# Patient Record
Sex: FEMALE | Race: OTHER | HISPANIC OR LATINO | URBAN - METROPOLITAN AREA
[De-identification: names, ages, dates, MRNs, and addresses within clinical notes are randomized per-mention and may not be internally consistent; named-entity substitution may affect disease eponyms.]

---

## 2017-09-12 ENCOUNTER — EMERGENCY (EMERGENCY)
Facility: HOSPITAL | Age: 47
LOS: 0 days | Discharge: HOME | End: 2017-09-13
Attending: EMERGENCY MEDICINE | Admitting: EMERGENCY MEDICINE
Payer: SELF-PAY

## 2017-09-12 VITALS
DIASTOLIC BLOOD PRESSURE: 51 MMHG | OXYGEN SATURATION: 100 % | TEMPERATURE: 98 F | WEIGHT: 220.02 LBS | HEIGHT: 55 IN | HEART RATE: 85 BPM | RESPIRATION RATE: 18 BRPM | SYSTOLIC BLOOD PRESSURE: 115 MMHG

## 2017-09-12 DIAGNOSIS — R07.9 CHEST PAIN, UNSPECIFIED: ICD-10-CM

## 2017-09-12 DIAGNOSIS — Z79.84 LONG TERM (CURRENT) USE OF ORAL HYPOGLYCEMIC DRUGS: ICD-10-CM

## 2017-09-12 DIAGNOSIS — E11.9 TYPE 2 DIABETES MELLITUS WITHOUT COMPLICATIONS: ICD-10-CM

## 2017-09-12 LAB
ALBUMIN SERPL ELPH-MCNC: 3.6 G/DL — SIGNIFICANT CHANGE UP (ref 3.3–5)
ALP SERPL-CCNC: 81 U/L — SIGNIFICANT CHANGE UP (ref 40–120)
ALT FLD-CCNC: 38 U/L — SIGNIFICANT CHANGE UP (ref 12–78)
ANION GAP SERPL CALC-SCNC: 7 MMOL/L — SIGNIFICANT CHANGE UP (ref 5–17)
AST SERPL-CCNC: 19 U/L — SIGNIFICANT CHANGE UP (ref 15–37)
BASOPHILS # BLD AUTO: 0.1 K/UL — SIGNIFICANT CHANGE UP (ref 0–0.2)
BASOPHILS NFR BLD AUTO: 0.9 % — SIGNIFICANT CHANGE UP (ref 0–2)
BILIRUB SERPL-MCNC: 0.2 MG/DL — SIGNIFICANT CHANGE UP (ref 0.2–1.2)
BUN SERPL-MCNC: 11 MG/DL — SIGNIFICANT CHANGE UP (ref 7–23)
CALCIUM SERPL-MCNC: 8.7 MG/DL — SIGNIFICANT CHANGE UP (ref 8.5–10.1)
CHLORIDE SERPL-SCNC: 103 MMOL/L — SIGNIFICANT CHANGE UP (ref 96–108)
CO2 SERPL-SCNC: 30 MMOL/L — SIGNIFICANT CHANGE UP (ref 22–31)
CREAT SERPL-MCNC: 1.36 MG/DL — HIGH (ref 0.5–1.3)
D DIMER BLD IA.RAPID-MCNC: <150 NG/ML DDU — SIGNIFICANT CHANGE UP
EOSINOPHIL # BLD AUTO: 0.2 K/UL — SIGNIFICANT CHANGE UP (ref 0–0.5)
EOSINOPHIL NFR BLD AUTO: 1.7 % — SIGNIFICANT CHANGE UP (ref 0–6)
GLUCOSE SERPL-MCNC: 88 MG/DL — SIGNIFICANT CHANGE UP (ref 70–99)
HCT VFR BLD CALC: 40 % — SIGNIFICANT CHANGE UP (ref 34.5–45)
HGB BLD-MCNC: 13.6 G/DL — SIGNIFICANT CHANGE UP (ref 11.5–15.5)
INR BLD: 0.94 RATIO — SIGNIFICANT CHANGE UP (ref 0.88–1.16)
LYMPHOCYTES # BLD AUTO: 3.6 K/UL — HIGH (ref 1–3.3)
LYMPHOCYTES # BLD AUTO: 37.8 % — SIGNIFICANT CHANGE UP (ref 13–44)
MCHC RBC-ENTMCNC: 31.5 PG — SIGNIFICANT CHANGE UP (ref 27–34)
MCHC RBC-ENTMCNC: 34 GM/DL — SIGNIFICANT CHANGE UP (ref 32–36)
MCV RBC AUTO: 92.9 FL — SIGNIFICANT CHANGE UP (ref 80–100)
MONOCYTES # BLD AUTO: 0.9 K/UL — SIGNIFICANT CHANGE UP (ref 0–0.9)
MONOCYTES NFR BLD AUTO: 9.8 % — SIGNIFICANT CHANGE UP (ref 2–14)
NEUTROPHILS # BLD AUTO: 4.7 K/UL — SIGNIFICANT CHANGE UP (ref 1.8–7.4)
NEUTROPHILS NFR BLD AUTO: 49.8 % — SIGNIFICANT CHANGE UP (ref 43–77)
PLATELET # BLD AUTO: 258 K/UL — SIGNIFICANT CHANGE UP (ref 150–400)
POTASSIUM SERPL-MCNC: 4.2 MMOL/L — SIGNIFICANT CHANGE UP (ref 3.5–5.3)
POTASSIUM SERPL-SCNC: 4.2 MMOL/L — SIGNIFICANT CHANGE UP (ref 3.5–5.3)
PROT SERPL-MCNC: 7.2 GM/DL — SIGNIFICANT CHANGE UP (ref 6–8.3)
PROTHROM AB SERPL-ACNC: 10.1 SEC — SIGNIFICANT CHANGE UP (ref 9.8–12.7)
RBC # BLD: 4.3 M/UL — SIGNIFICANT CHANGE UP (ref 3.8–5.2)
RBC # FLD: 12.7 % — SIGNIFICANT CHANGE UP (ref 10.3–14.5)
SODIUM SERPL-SCNC: 140 MMOL/L — SIGNIFICANT CHANGE UP (ref 135–145)
TROPONIN I SERPL-MCNC: <0.015 NG/ML — SIGNIFICANT CHANGE UP (ref 0.01–0.04)
WBC # BLD: 9.4 K/UL — SIGNIFICANT CHANGE UP (ref 3.8–10.5)
WBC # FLD AUTO: 9.4 K/UL — SIGNIFICANT CHANGE UP (ref 3.8–10.5)

## 2017-09-12 PROCEDURE — 99285 EMERGENCY DEPT VISIT HI MDM: CPT

## 2017-09-12 PROCEDURE — 93010 ELECTROCARDIOGRAM REPORT: CPT

## 2017-09-12 PROCEDURE — 71010: CPT | Mod: 26

## 2017-09-12 RX ORDER — ASPIRIN/CALCIUM CARB/MAGNESIUM 324 MG
324 TABLET ORAL DAILY
Qty: 0 | Refills: 0 | Status: DISCONTINUED | OUTPATIENT
Start: 2017-09-12 | End: 2017-09-13

## 2017-09-12 RX ORDER — ASPIRIN/CALCIUM CARB/MAGNESIUM 324 MG
81 TABLET ORAL DAILY
Qty: 0 | Refills: 0 | Status: DISCONTINUED | OUTPATIENT
Start: 2017-09-12 | End: 2017-09-12

## 2017-09-12 RX ADMIN — Medication 324 MILLIGRAM(S): at 21:12

## 2017-09-12 NOTE — ED PROVIDER NOTE - MEDICAL DECISION MAKING DETAILS
HEART score 2.  EKG nonischemic.  CXR clear.  Troponin x 2 negative.  Unlikely ACS.  Wells low risk, D-dimer negative.  Unlikely PE.  No signs of pulmonary edema, pneumonia, or pleural effusion.  No concern for pericarditis, myocarditis, or aortic dissection.  Okay for d/c home with nonspecific chest pain.  F/u with PCP this week.  Return precautions given.

## 2017-09-12 NOTE — ED PROVIDER NOTE - OBJECTIVE STATEMENT
45 yo female h/o DM on metformin presents with CP and SOB x 2 weeks. +Dizziness, ecchymosis to posterior RLE (no pain), states her LUE and LLE fall asleep at times. PCP Dr. Lucia Angeles.

## 2017-09-13 VITALS
SYSTOLIC BLOOD PRESSURE: 117 MMHG | OXYGEN SATURATION: 100 % | DIASTOLIC BLOOD PRESSURE: 52 MMHG | HEART RATE: 88 BPM | RESPIRATION RATE: 17 BRPM

## 2017-09-13 LAB — TROPONIN I SERPL-MCNC: <0.015 NG/ML — SIGNIFICANT CHANGE UP (ref 0.01–0.04)

## 2017-09-13 NOTE — ED ADULT NURSE NOTE - OBJECTIVE STATEMENT
pt c/o chest pain that radiates to left arm and leg. Pt states " it feels like my arm and leg are asleep" pt states " after I eat it's hard to breath" family at bedside

## 2018-07-07 ENCOUNTER — EMERGENCY (EMERGENCY)
Facility: HOSPITAL | Age: 48
LOS: 0 days | Discharge: HOME | End: 2018-07-07
Attending: EMERGENCY MEDICINE | Admitting: EMERGENCY MEDICINE
Payer: MEDICAID

## 2018-07-07 VITALS
OXYGEN SATURATION: 100 % | DIASTOLIC BLOOD PRESSURE: 83 MMHG | HEIGHT: 61 IN | TEMPERATURE: 98 F | HEART RATE: 89 BPM | WEIGHT: 222.01 LBS | RESPIRATION RATE: 18 BRPM | SYSTOLIC BLOOD PRESSURE: 121 MMHG

## 2018-07-07 VITALS
OXYGEN SATURATION: 100 % | DIASTOLIC BLOOD PRESSURE: 62 MMHG | HEART RATE: 65 BPM | RESPIRATION RATE: 18 BRPM | TEMPERATURE: 98 F | SYSTOLIC BLOOD PRESSURE: 103 MMHG

## 2018-07-07 DIAGNOSIS — N93.8 OTHER SPECIFIED ABNORMAL UTERINE AND VAGINAL BLEEDING: ICD-10-CM

## 2018-07-07 LAB
ALBUMIN SERPL ELPH-MCNC: 3.4 G/DL — SIGNIFICANT CHANGE UP (ref 3.3–5)
ALP SERPL-CCNC: 73 U/L — SIGNIFICANT CHANGE UP (ref 40–120)
ALT FLD-CCNC: 59 U/L — SIGNIFICANT CHANGE UP (ref 12–78)
ANION GAP SERPL CALC-SCNC: 6 MMOL/L — SIGNIFICANT CHANGE UP (ref 5–17)
APPEARANCE UR: ABNORMAL
AST SERPL-CCNC: 34 U/L — SIGNIFICANT CHANGE UP (ref 15–37)
BACTERIA # UR AUTO: ABNORMAL
BASOPHILS # BLD AUTO: 0.03 K/UL — SIGNIFICANT CHANGE UP (ref 0–0.2)
BASOPHILS NFR BLD AUTO: 0.4 % — SIGNIFICANT CHANGE UP (ref 0–2)
BILIRUB SERPL-MCNC: 0.2 MG/DL — SIGNIFICANT CHANGE UP (ref 0.2–1.2)
BILIRUB UR-MCNC: NEGATIVE — SIGNIFICANT CHANGE UP
BLD GP AB SCN SERPL QL: SIGNIFICANT CHANGE UP
BUN SERPL-MCNC: 9 MG/DL — SIGNIFICANT CHANGE UP (ref 7–23)
CALCIUM SERPL-MCNC: 8.4 MG/DL — LOW (ref 8.5–10.1)
CHLORIDE SERPL-SCNC: 111 MMOL/L — HIGH (ref 96–108)
CO2 SERPL-SCNC: 26 MMOL/L — SIGNIFICANT CHANGE UP (ref 22–31)
COLOR SPEC: YELLOW — SIGNIFICANT CHANGE UP
CREAT SERPL-MCNC: 0.72 MG/DL — SIGNIFICANT CHANGE UP (ref 0.5–1.3)
DIFF PNL FLD: ABNORMAL
EOSINOPHIL # BLD AUTO: 0.17 K/UL — SIGNIFICANT CHANGE UP (ref 0–0.5)
EOSINOPHIL NFR BLD AUTO: 2 % — SIGNIFICANT CHANGE UP (ref 0–6)
EPI CELLS # UR: SIGNIFICANT CHANGE UP
GLUCOSE SERPL-MCNC: 108 MG/DL — HIGH (ref 70–99)
GLUCOSE UR QL: NEGATIVE MG/DL — SIGNIFICANT CHANGE UP
HCT VFR BLD CALC: 36.1 % — SIGNIFICANT CHANGE UP (ref 34.5–45)
HGB BLD-MCNC: 12 G/DL — SIGNIFICANT CHANGE UP (ref 11.5–15.5)
IMM GRANULOCYTES NFR BLD AUTO: 0.2 % — SIGNIFICANT CHANGE UP (ref 0–1.5)
KETONES UR-MCNC: NEGATIVE — SIGNIFICANT CHANGE UP
LEUKOCYTE ESTERASE UR-ACNC: ABNORMAL
LYMPHOCYTES # BLD AUTO: 2.85 K/UL — SIGNIFICANT CHANGE UP (ref 1–3.3)
LYMPHOCYTES # BLD AUTO: 34.2 % — SIGNIFICANT CHANGE UP (ref 13–44)
MCHC RBC-ENTMCNC: 30.5 PG — SIGNIFICANT CHANGE UP (ref 27–34)
MCHC RBC-ENTMCNC: 33.2 GM/DL — SIGNIFICANT CHANGE UP (ref 32–36)
MCV RBC AUTO: 91.9 FL — SIGNIFICANT CHANGE UP (ref 80–100)
MONOCYTES # BLD AUTO: 0.71 K/UL — SIGNIFICANT CHANGE UP (ref 0–0.9)
MONOCYTES NFR BLD AUTO: 8.5 % — SIGNIFICANT CHANGE UP (ref 2–14)
NEUTROPHILS # BLD AUTO: 4.55 K/UL — SIGNIFICANT CHANGE UP (ref 1.8–7.4)
NEUTROPHILS NFR BLD AUTO: 54.7 % — SIGNIFICANT CHANGE UP (ref 43–77)
NITRITE UR-MCNC: NEGATIVE — SIGNIFICANT CHANGE UP
NRBC # BLD: 0 /100 WBCS — SIGNIFICANT CHANGE UP (ref 0–0)
PH UR: 5 — SIGNIFICANT CHANGE UP (ref 5–8)
PLATELET # BLD AUTO: 261 K/UL — SIGNIFICANT CHANGE UP (ref 150–400)
POTASSIUM SERPL-MCNC: 4.1 MMOL/L — SIGNIFICANT CHANGE UP (ref 3.5–5.3)
POTASSIUM SERPL-SCNC: 4.1 MMOL/L — SIGNIFICANT CHANGE UP (ref 3.5–5.3)
PROT SERPL-MCNC: 6.9 GM/DL — SIGNIFICANT CHANGE UP (ref 6–8.3)
PROT UR-MCNC: 30 MG/DL
RBC # BLD: 3.93 M/UL — SIGNIFICANT CHANGE UP (ref 3.8–5.2)
RBC # FLD: 13.1 % — SIGNIFICANT CHANGE UP (ref 10.3–14.5)
RBC CASTS # UR COMP ASSIST: >50 /HPF (ref 0–4)
SODIUM SERPL-SCNC: 143 MMOL/L — SIGNIFICANT CHANGE UP (ref 135–145)
SP GR SPEC: 1.02 — SIGNIFICANT CHANGE UP (ref 1.01–1.02)
TYPE + AB SCN PNL BLD: SIGNIFICANT CHANGE UP
UROBILINOGEN FLD QL: NEGATIVE MG/DL — SIGNIFICANT CHANGE UP
WBC # BLD: 8.33 K/UL — SIGNIFICANT CHANGE UP (ref 3.8–10.5)
WBC # FLD AUTO: 8.33 K/UL — SIGNIFICANT CHANGE UP (ref 3.8–10.5)
WBC UR QL: SIGNIFICANT CHANGE UP

## 2018-07-07 PROCEDURE — 99284 EMERGENCY DEPT VISIT MOD MDM: CPT | Mod: 25

## 2018-07-07 PROCEDURE — 76830 TRANSVAGINAL US NON-OB: CPT | Mod: 26

## 2018-07-07 RX ORDER — SODIUM CHLORIDE 9 MG/ML
1000 INJECTION INTRAMUSCULAR; INTRAVENOUS; SUBCUTANEOUS ONCE
Qty: 0 | Refills: 0 | Status: COMPLETED | OUTPATIENT
Start: 2018-07-07 | End: 2018-07-07

## 2018-07-07 RX ORDER — ONDANSETRON 8 MG/1
4 TABLET, FILM COATED ORAL ONCE
Qty: 0 | Refills: 0 | Status: COMPLETED | OUTPATIENT
Start: 2018-07-07 | End: 2018-07-07

## 2018-07-07 RX ORDER — KETOROLAC TROMETHAMINE 30 MG/ML
30 SYRINGE (ML) INJECTION ONCE
Qty: 0 | Refills: 0 | Status: DISCONTINUED | OUTPATIENT
Start: 2018-07-07 | End: 2018-07-07

## 2018-07-07 RX ORDER — ACETAMINOPHEN 500 MG
1000 TABLET ORAL ONCE
Qty: 0 | Refills: 0 | Status: COMPLETED | OUTPATIENT
Start: 2018-07-07 | End: 2018-07-07

## 2018-07-07 RX ADMIN — ONDANSETRON 4 MILLIGRAM(S): 8 TABLET, FILM COATED ORAL at 06:50

## 2018-07-07 RX ADMIN — Medication 1000 MILLIGRAM(S): at 06:50

## 2018-07-07 RX ADMIN — Medication 30 MILLIGRAM(S): at 09:55

## 2018-07-07 RX ADMIN — SODIUM CHLORIDE 1000 MILLILITER(S): 9 INJECTION INTRAMUSCULAR; INTRAVENOUS; SUBCUTANEOUS at 06:50

## 2018-07-07 RX ADMIN — Medication 1000 MILLIGRAM(S): at 07:50

## 2018-07-07 NOTE — ED PROVIDER NOTE - PROGRESS NOTE DETAILS
Results explained to patient via : [id number].  Pt feeling better and understands return precautions and to f/u with OB. Bernardo Bolden, DO

## 2018-07-07 NOTE — ED ADULT TRIAGE NOTE - CHIEF COMPLAINT QUOTE
vag bleeding 2days with abd pain vag bleeding 2days with abd pain today more bleeding with clots denies dizzy

## 2018-07-07 NOTE — ED ADULT NURSE NOTE - CHPI ED SYMPTOMS NEG
no tingling/no fever/no numbness/no dizziness/no decreased eating/drinking/no weakness/no chills/no vomiting

## 2018-07-07 NOTE — ED ADULT NURSE NOTE - OBJECTIVE STATEMENT
Pt presented to ED c/o vaginal bleeding. As per pt, pt's experiencing vaginal bleeding w/ clots since yesterday morning. used 10 pads so far. LMP on May 9th. Pt also c/o dizziness, nausea, and abd pain that starts from right mid back wraps around to upper abd and lower abd cramp.

## 2018-07-07 NOTE — ED PROVIDER NOTE - OBJECTIVE STATEMENT
46 y/o female with h/o DM type 2 on Metformin p/w heavy vaginal bleeding for the past 24 hours.  Pt soaked 10 pads and has noted clots over the past 24 hours.  LMP was in May.  PT also has diffuse pelvic pain.  No n/v/d, f/c/r, or other symptoms.

## 2018-07-08 LAB
CULTURE RESULTS: SIGNIFICANT CHANGE UP
SPECIMEN SOURCE: SIGNIFICANT CHANGE UP

## 2018-09-11 ENCOUNTER — INPATIENT (INPATIENT)
Facility: HOSPITAL | Age: 48
LOS: 4 days | Discharge: HOME | End: 2018-09-16
Attending: FAMILY MEDICINE | Admitting: FAMILY MEDICINE
Payer: MEDICAID

## 2018-09-11 VITALS
RESPIRATION RATE: 20 BRPM | WEIGHT: 229.94 LBS | SYSTOLIC BLOOD PRESSURE: 133 MMHG | TEMPERATURE: 99 F | OXYGEN SATURATION: 94 % | HEART RATE: 160 BPM | DIASTOLIC BLOOD PRESSURE: 91 MMHG | HEIGHT: 62 IN

## 2018-09-11 LAB
BASOPHILS # BLD AUTO: 0.04 K/UL — SIGNIFICANT CHANGE UP (ref 0–0.2)
BASOPHILS NFR BLD AUTO: 0.4 % — SIGNIFICANT CHANGE UP (ref 0–2)
EOSINOPHIL # BLD AUTO: 0.19 K/UL — SIGNIFICANT CHANGE UP (ref 0–0.5)
EOSINOPHIL NFR BLD AUTO: 1.7 % — SIGNIFICANT CHANGE UP (ref 0–6)
HCT VFR BLD CALC: 44.3 % — SIGNIFICANT CHANGE UP (ref 34.5–45)
HGB BLD-MCNC: 14.8 G/DL — SIGNIFICANT CHANGE UP (ref 11.5–15.5)
IMM GRANULOCYTES NFR BLD AUTO: 0.7 % — SIGNIFICANT CHANGE UP (ref 0–1.5)
LYMPHOCYTES # BLD AUTO: 2.82 K/UL — SIGNIFICANT CHANGE UP (ref 1–3.3)
LYMPHOCYTES # BLD AUTO: 25.1 % — SIGNIFICANT CHANGE UP (ref 13–44)
MCHC RBC-ENTMCNC: 30.3 PG — SIGNIFICANT CHANGE UP (ref 27–34)
MCHC RBC-ENTMCNC: 33.4 GM/DL — SIGNIFICANT CHANGE UP (ref 32–36)
MCV RBC AUTO: 90.6 FL — SIGNIFICANT CHANGE UP (ref 80–100)
MONOCYTES # BLD AUTO: 0.89 K/UL — SIGNIFICANT CHANGE UP (ref 0–0.9)
MONOCYTES NFR BLD AUTO: 7.9 % — SIGNIFICANT CHANGE UP (ref 2–14)
NEUTROPHILS # BLD AUTO: 7.22 K/UL — SIGNIFICANT CHANGE UP (ref 1.8–7.4)
NEUTROPHILS NFR BLD AUTO: 64.2 % — SIGNIFICANT CHANGE UP (ref 43–77)
NRBC # BLD: 0 /100 WBCS — SIGNIFICANT CHANGE UP (ref 0–0)
PLATELET # BLD AUTO: 292 K/UL — SIGNIFICANT CHANGE UP (ref 150–400)
RBC # BLD: 4.89 M/UL — SIGNIFICANT CHANGE UP (ref 3.8–5.2)
RBC # FLD: 12.9 % — SIGNIFICANT CHANGE UP (ref 10.3–14.5)
WBC # BLD: 11.24 K/UL — HIGH (ref 3.8–10.5)
WBC # FLD AUTO: 11.24 K/UL — HIGH (ref 3.8–10.5)

## 2018-09-11 PROCEDURE — 93010 ELECTROCARDIOGRAM REPORT: CPT

## 2018-09-11 RX ORDER — ACETAMINOPHEN 500 MG
1000 TABLET ORAL ONCE
Qty: 0 | Refills: 0 | Status: COMPLETED | OUTPATIENT
Start: 2018-09-11 | End: 2018-09-11

## 2018-09-11 RX ORDER — SODIUM CHLORIDE 9 MG/ML
2100 INJECTION INTRAMUSCULAR; INTRAVENOUS; SUBCUTANEOUS ONCE
Qty: 0 | Refills: 0 | Status: COMPLETED | OUTPATIENT
Start: 2018-09-11 | End: 2018-09-11

## 2018-09-11 RX ORDER — PIPERACILLIN AND TAZOBACTAM 4; .5 G/20ML; G/20ML
3.38 INJECTION, POWDER, LYOPHILIZED, FOR SOLUTION INTRAVENOUS ONCE
Qty: 0 | Refills: 0 | Status: COMPLETED | OUTPATIENT
Start: 2018-09-11 | End: 2018-09-11

## 2018-09-11 RX ORDER — SODIUM CHLORIDE 9 MG/ML
1000 INJECTION INTRAMUSCULAR; INTRAVENOUS; SUBCUTANEOUS ONCE
Qty: 0 | Refills: 0 | Status: COMPLETED | OUTPATIENT
Start: 2018-09-11 | End: 2018-09-11

## 2018-09-11 RX ORDER — VANCOMYCIN HCL 1 G
1500 VIAL (EA) INTRAVENOUS ONCE
Qty: 0 | Refills: 0 | Status: COMPLETED | OUTPATIENT
Start: 2018-09-11 | End: 2018-09-12

## 2018-09-11 RX ADMIN — SODIUM CHLORIDE 1400 MILLILITER(S): 9 INJECTION INTRAMUSCULAR; INTRAVENOUS; SUBCUTANEOUS at 23:43

## 2018-09-11 RX ADMIN — SODIUM CHLORIDE 1000 MILLILITER(S): 9 INJECTION INTRAMUSCULAR; INTRAVENOUS; SUBCUTANEOUS at 23:30

## 2018-09-11 RX ADMIN — Medication 1000 MILLIGRAM(S): at 23:59

## 2018-09-11 RX ADMIN — PIPERACILLIN AND TAZOBACTAM 200 GRAM(S): 4; .5 INJECTION, POWDER, LYOPHILIZED, FOR SOLUTION INTRAVENOUS at 23:55

## 2018-09-11 NOTE — ED ADULT TRIAGE NOTE - CHIEF COMPLAINT QUOTE
Pt presents to ER c/o productive cough x 3 weeks and "burning" "painful" chest discomfort, SOB. Pt reports chest discomfort radiates to back.

## 2018-09-12 DIAGNOSIS — Z90.49 ACQUIRED ABSENCE OF OTHER SPECIFIED PARTS OF DIGESTIVE TRACT: Chronic | ICD-10-CM

## 2018-09-12 PROBLEM — E11.9 TYPE 2 DIABETES MELLITUS WITHOUT COMPLICATIONS: Chronic | Status: ACTIVE | Noted: 2017-09-13

## 2018-09-12 LAB
ADD ON TEST-SPECIMEN IN LAB: SIGNIFICANT CHANGE UP
ALBUMIN SERPL ELPH-MCNC: 3.8 G/DL — SIGNIFICANT CHANGE UP (ref 3.3–5)
ALP SERPL-CCNC: 96 U/L — SIGNIFICANT CHANGE UP (ref 40–120)
ALT FLD-CCNC: 72 U/L — SIGNIFICANT CHANGE UP (ref 12–78)
ANION GAP SERPL CALC-SCNC: 11 MMOL/L — SIGNIFICANT CHANGE UP (ref 5–17)
APPEARANCE UR: CLEAR — SIGNIFICANT CHANGE UP
AST SERPL-CCNC: 40 U/L — HIGH (ref 15–37)
BACTERIA # UR AUTO: ABNORMAL
BILIRUB SERPL-MCNC: 0.3 MG/DL — SIGNIFICANT CHANGE UP (ref 0.2–1.2)
BILIRUB UR-MCNC: NEGATIVE — SIGNIFICANT CHANGE UP
BUN SERPL-MCNC: 10 MG/DL — SIGNIFICANT CHANGE UP (ref 7–23)
CALCIUM SERPL-MCNC: 9.2 MG/DL — SIGNIFICANT CHANGE UP (ref 8.5–10.1)
CHLORIDE SERPL-SCNC: 106 MMOL/L — SIGNIFICANT CHANGE UP (ref 96–108)
CO2 SERPL-SCNC: 22 MMOL/L — SIGNIFICANT CHANGE UP (ref 22–31)
COLOR SPEC: YELLOW — SIGNIFICANT CHANGE UP
CREAT SERPL-MCNC: 0.9 MG/DL — SIGNIFICANT CHANGE UP (ref 0.5–1.3)
DIFF PNL FLD: NEGATIVE — SIGNIFICANT CHANGE UP
EPI CELLS # UR: SIGNIFICANT CHANGE UP
GLUCOSE SERPL-MCNC: 123 MG/DL — HIGH (ref 70–99)
GLUCOSE UR QL: NEGATIVE MG/DL — SIGNIFICANT CHANGE UP
KETONES UR-MCNC: NEGATIVE — SIGNIFICANT CHANGE UP
LACTATE SERPL-SCNC: 1.3 MMOL/L — SIGNIFICANT CHANGE UP (ref 0.7–2)
LACTATE SERPL-SCNC: 1.3 MMOL/L — SIGNIFICANT CHANGE UP (ref 0.7–2)
LEUKOCYTE ESTERASE UR-ACNC: ABNORMAL
NITRITE UR-MCNC: NEGATIVE — SIGNIFICANT CHANGE UP
PH UR: 6 — SIGNIFICANT CHANGE UP (ref 5–8)
POTASSIUM SERPL-MCNC: 4 MMOL/L — SIGNIFICANT CHANGE UP (ref 3.5–5.3)
POTASSIUM SERPL-SCNC: 4 MMOL/L — SIGNIFICANT CHANGE UP (ref 3.5–5.3)
PROT SERPL-MCNC: 8.3 GM/DL — SIGNIFICANT CHANGE UP (ref 6–8.3)
PROT UR-MCNC: NEGATIVE MG/DL — SIGNIFICANT CHANGE UP
RAPID RVP RESULT: SIGNIFICANT CHANGE UP
RBC CASTS # UR COMP ASSIST: SIGNIFICANT CHANGE UP /HPF (ref 0–4)
SODIUM SERPL-SCNC: 139 MMOL/L — SIGNIFICANT CHANGE UP (ref 135–145)
SP GR SPEC: 1 — LOW (ref 1.01–1.02)
UROBILINOGEN FLD QL: NEGATIVE MG/DL — SIGNIFICANT CHANGE UP
WBC UR QL: SIGNIFICANT CHANGE UP

## 2018-09-12 PROCEDURE — 99291 CRITICAL CARE FIRST HOUR: CPT

## 2018-09-12 PROCEDURE — 71046 X-RAY EXAM CHEST 2 VIEWS: CPT | Mod: 26

## 2018-09-12 RX ORDER — INFLUENZA VIRUS VACCINE 15; 15; 15; 15 UG/.5ML; UG/.5ML; UG/.5ML; UG/.5ML
0.5 SUSPENSION INTRAMUSCULAR ONCE
Qty: 0 | Refills: 0 | Status: COMPLETED | OUTPATIENT
Start: 2018-09-12 | End: 2018-09-16

## 2018-09-12 RX ORDER — ACETAMINOPHEN 500 MG
650 TABLET ORAL EVERY 6 HOURS
Qty: 0 | Refills: 0 | Status: DISCONTINUED | OUTPATIENT
Start: 2018-09-12 | End: 2018-09-16

## 2018-09-12 RX ORDER — ACETAMINOPHEN 500 MG
1000 TABLET ORAL ONCE
Qty: 0 | Refills: 0 | Status: COMPLETED | OUTPATIENT
Start: 2018-09-12 | End: 2018-09-12

## 2018-09-12 RX ORDER — IBUPROFEN 200 MG
800 TABLET ORAL EVERY 6 HOURS
Qty: 0 | Refills: 0 | Status: DISCONTINUED | OUTPATIENT
Start: 2018-09-12 | End: 2018-09-16

## 2018-09-12 RX ORDER — ENOXAPARIN SODIUM 100 MG/ML
40 INJECTION SUBCUTANEOUS EVERY 24 HOURS
Qty: 0 | Refills: 0 | Status: DISCONTINUED | OUTPATIENT
Start: 2018-09-12 | End: 2018-09-16

## 2018-09-12 RX ORDER — AZITHROMYCIN 500 MG/1
500 TABLET, FILM COATED ORAL DAILY
Qty: 0 | Refills: 0 | Status: DISCONTINUED | OUTPATIENT
Start: 2018-09-12 | End: 2018-09-16

## 2018-09-12 RX ORDER — ONDANSETRON 8 MG/1
4 TABLET, FILM COATED ORAL EVERY 4 HOURS
Qty: 0 | Refills: 0 | Status: DISCONTINUED | OUTPATIENT
Start: 2018-09-12 | End: 2018-09-16

## 2018-09-12 RX ORDER — SODIUM CHLORIDE 9 MG/ML
1000 INJECTION INTRAMUSCULAR; INTRAVENOUS; SUBCUTANEOUS ONCE
Qty: 0 | Refills: 0 | Status: COMPLETED | OUTPATIENT
Start: 2018-09-12 | End: 2018-09-12

## 2018-09-12 RX ORDER — CEFTRIAXONE 500 MG/1
1000 INJECTION, POWDER, FOR SOLUTION INTRAMUSCULAR; INTRAVENOUS EVERY 24 HOURS
Qty: 0 | Refills: 0 | Status: DISCONTINUED | OUTPATIENT
Start: 2018-09-12 | End: 2018-09-13

## 2018-09-12 RX ORDER — CEFTRIAXONE 500 MG/1
1 INJECTION, POWDER, FOR SOLUTION INTRAMUSCULAR; INTRAVENOUS EVERY 24 HOURS
Qty: 0 | Refills: 0 | Status: DISCONTINUED | OUTPATIENT
Start: 2018-09-12 | End: 2018-09-12

## 2018-09-12 RX ORDER — SODIUM CHLORIDE 9 MG/ML
1000 INJECTION INTRAMUSCULAR; INTRAVENOUS; SUBCUTANEOUS
Qty: 0 | Refills: 0 | Status: DISCONTINUED | OUTPATIENT
Start: 2018-09-12 | End: 2018-09-16

## 2018-09-12 RX ORDER — METFORMIN HYDROCHLORIDE 850 MG/1
500 TABLET ORAL
Qty: 0 | Refills: 0 | Status: DISCONTINUED | OUTPATIENT
Start: 2018-09-12 | End: 2018-09-16

## 2018-09-12 RX ORDER — CEFTRIAXONE 500 MG/1
1000 INJECTION, POWDER, FOR SOLUTION INTRAMUSCULAR; INTRAVENOUS EVERY 24 HOURS
Qty: 0 | Refills: 0 | Status: COMPLETED | OUTPATIENT
Start: 2018-09-12 | End: 2018-09-16

## 2018-09-12 RX ORDER — DOCUSATE SODIUM 100 MG
100 CAPSULE ORAL
Qty: 0 | Refills: 0 | Status: DISCONTINUED | OUTPATIENT
Start: 2018-09-12 | End: 2018-09-16

## 2018-09-12 RX ADMIN — ENOXAPARIN SODIUM 40 MILLIGRAM(S): 100 INJECTION SUBCUTANEOUS at 20:51

## 2018-09-12 RX ADMIN — Medication 600 MILLIGRAM(S): at 17:27

## 2018-09-12 RX ADMIN — Medication 600 MILLIGRAM(S): at 08:31

## 2018-09-12 RX ADMIN — METFORMIN HYDROCHLORIDE 500 MILLIGRAM(S): 850 TABLET ORAL at 17:27

## 2018-09-12 RX ADMIN — Medication 250 MILLIGRAM(S): at 00:45

## 2018-09-12 RX ADMIN — Medication 400 MILLIGRAM(S): at 17:22

## 2018-09-12 RX ADMIN — CEFTRIAXONE 1000 MILLIGRAM(S): 500 INJECTION, POWDER, FOR SOLUTION INTRAMUSCULAR; INTRAVENOUS at 12:41

## 2018-09-12 RX ADMIN — AZITHROMYCIN 500 MILLIGRAM(S): 500 TABLET, FILM COATED ORAL at 12:41

## 2018-09-12 RX ADMIN — SODIUM CHLORIDE 100 MILLILITER(S): 9 INJECTION INTRAMUSCULAR; INTRAVENOUS; SUBCUTANEOUS at 21:03

## 2018-09-12 RX ADMIN — Medication 650 MILLIGRAM(S): at 15:29

## 2018-09-12 RX ADMIN — Medication 800 MILLIGRAM(S): at 19:55

## 2018-09-12 RX ADMIN — SODIUM CHLORIDE 1000 MILLILITER(S): 9 INJECTION INTRAMUSCULAR; INTRAVENOUS; SUBCUTANEOUS at 17:21

## 2018-09-12 RX ADMIN — Medication 800 MILLIGRAM(S): at 19:38

## 2018-09-12 RX ADMIN — Medication 1000 MILLIGRAM(S): at 19:55

## 2018-09-12 NOTE — ED PROVIDER NOTE - MEDICAL DECISION MAKING DETAILS
pt w/ flu like symptoms, possibly viral w/ bacterial super infection.  sepsis protocol started, CXR/labs pending

## 2018-09-12 NOTE — ED PROVIDER NOTE - PROGRESS NOTE DETAILS
02 94% @ rest on RA, will drop to 92% while speaking - cxr consistent w/ PNA - will admit, d/w Dr. Keller

## 2018-09-12 NOTE — ED PROVIDER NOTE - OBJECTIVE STATEMENT
46 y/o F w/ Hx DM pw flu like symptoms.  Pt notes 3 wks of cough, initially green, now white.  Then 3 days of fever, headache, sore throat, and CP only when coughing.  Denies AP, n/v, d/c, dysuria/frequency.  No recent travel or known sick contacts.

## 2018-09-12 NOTE — ED ADULT NURSE NOTE - OBJECTIVE STATEMENT
Patient presents to ED complaining of cough [id number] and CP. Patient states she has had chest congestion and cough [id number], has been taken medicine at home, unsure of name. Patient states CP started yesterday describes as tightness and pressure, pain radiates to back and face. Patient complaining of SOB, unlabored breathing, no use of accessory muscles. Patient complaining of headache, dizziness, weakness, sore throat. Patient denies NVD. Patient febrile and tachycardic upon arrival. A&ox3, able to ambulate without assistance, family at bedside

## 2018-09-12 NOTE — ED PROVIDER NOTE - CRITICAL CARE PROVIDED
documentation/consult w/ pt's family directly relating to pts condition/interpretation of diagnostic studies

## 2018-09-12 NOTE — H&P ADULT - HISTORY OF PRESENT ILLNESS
This is a pleasant 46 y/o F w/ Hx DM not on insulin presented to the ED complaining of ongoing fevers, productive cough and shortness of breath for the past 3 weeks. She took Tylenol and Motrin without any improvement. She denies sick contacts or recent hospitalizations within the past 90 days. Patient also complains of headache, sinus pressure, sore throat and chest pain with rigourous coughing.     In the ED, notable labs include mildly elevated WBC 11K, otherwise unremarkable UA and CMP. CXR done with concern for right sided pneumonia. Patient noted to desat to 92% on RA with prolonged conversation. She was given sepsis protocol with 3L IVFs NS + antibiotics: Vanc and Zosyn with admission requested     PMH:  DM - on Metformin    Meds: reviewed.   NKDA  Social Hx: Lives at home with  and 2 children. Nonsmoker, no ETOH or illicit drug use.   Family: Strong family history for diabetes in her mother and father.   Surgical Hx: Cholescytectomy, hx of     Vital Signs Last 24 Hrs  T(C): 37 (12 Sep 2018 04:02), Max: 39.7 (11 Sep 2018 23:23)  T(F): 98.6 (12 Sep 2018 04:02), Max: 103.4 (11 Sep 2018 23:23)  HR: 97 (12 Sep 2018 04:55) (97 - 160)  BP: 107/63 (12 Sep 2018 04:55) (103/75 - 133/91)  BP(mean): --  RR: 16 (12 Sep 2018 04:55) (16 - 31)  SpO2: 95% (12 Sep 2018 04:55) (94% - 95%)    PHYSICAL EXAM:  Constitutional: NAD, awake and alert, well-developed  HEENT: PERR, EOMI, Normal Hearing, MMM  Neck: Soft and supple, No LAD, No JVD  Respiratory: Breath sounds are clear bilaterally, No wheezing, rales or rhonchi  Cardiovascular: S1 and S2, regular rate and rhythm, no Murmurs, gallops or rubs  Gastrointestinal: Bowel Sounds present, soft, nontender, nondistended, no guarding, no rebound  Extremities: No peripheral edema  Vascular: 2+ peripheral pulses  Neurological: A/O x 3, no focal deficits  Musculoskeletal: 5/5 strength b/l upper and lower extremities  Skin: No rashes    MEDICATIONS:  MEDICATIONS  (STANDING):  azithromycin   Tablet 500 milliGRAM(s) Oral daily  cefTRIAXone   IVPB 1 Gram(s) IV Intermittent every 24 hours  enoxaparin Injectable 40 milliGRAM(s) SubCutaneous every 24 hours  guaiFENesin  milliGRAM(s) Oral every 12 hours  metFORMIN 500 milliGRAM(s) Oral two times a day    LABS: All Labs Reviewed:                        14.8   11.24 )-----------( 292      ( 11 Sep 2018 23:28 )             44.3         139  |  106  |  10  ----------------------------<  123<H>  4.0   |  22  |  0.90    Ca    9.2      11 Sep 2018 23:28    TPro  8.3  /  Alb  3.8  /  TBili  0.3  /  DBili  x   /  AST  40<H>  /  ALT  72  /  AlkPhos  96      Blood Culture: pending  Urine culture: pending    CXR: official read pending however with right sided infiltrate concerning for pneumonia.     ASSESSMENT AND PLAN: This is a pleasant 46 y/o F w/ Hx DM not on insulin presented to the ED complaining of ongoing fevers, productive cough and shortness of breath for the past 3 weeks. She took Tylenol and Motrin without any improvement. She denies sick contacts or recent hospitalizations within the past 90 days. Patient also complains of headache, sinus pressure, sore throat and chest pain with rigourous coughing.     In the ED, Tmax 103.1, notable labs include mildly elevated WBC 11K, otherwise unremarkable UA and CMP. CXR done with concern for right sided pneumonia. Patient noted to desat to 92% on RA with prolonged conversation. She was given sepsis protocol with 3L IVFs NS + antibiotics: Vanc and Zosyn with admission requested     ROS: stated above including Nausea. Denies diarrhea and emesis.   PMH:  DM - on Metformin    Meds: reviewed.   NKDA  Social Hx: Lives at home with  and 2 children. Nonsmoker, no ETOH or illicit drug use.   Family: Strong family history for diabetes in her mother and father.   Surgical Hx: Cholescytectomy, hx of     Vital Signs Last 24 Hrs  T(C): 37 (12 Sep 2018 04:02), Max: 39.7 (11 Sep 2018 23:23)  T(F): 98.6 (12 Sep 2018 04:02), Max: 103.4 (11 Sep 2018 23:23)  HR: 97 (12 Sep 2018 04:55) (97 - 160)  BP: 107/63 (12 Sep 2018 04:55) (103/75 - 133/91)  BP(mean): --  RR: 16 (12 Sep 2018 04:55) (16 - 31)  SpO2: 95% (12 Sep 2018 04:55) (94% - 95%)    PHYSICAL EXAM:  Constitutional:  female ill appearing, mildly dyspneic with coughing during conversation,  awake and alert, well-developed   HEENT: PERR, EOMI, Normal Hearing, MMM  Neck: Soft and supple, No LAD, No JVD  Respiratory: Right sided anterior rhonchi, no wheezing or rales auscultated   Cardiovascular: S1 and S2, regular rate and rhythm, no Murmurs, gallops or rubs  Gastrointestinal: Bowel Sounds present, soft, nontender, nondistended, no guarding, no rebound  Extremities: No peripheral edema  Vascular: 2+ peripheral pulses  Neurological: A/O x 3, no focal deficits  Musculoskeletal: 5/5 strength b/l upper and lower extremities  Skin: No rashes    MEDICATIONS  (STANDING):  azithromycin   Tablet 500 milliGRAM(s) Oral daily  cefTRIAXone   IVPB 1 Gram(s) IV Intermittent every 24 hours  enoxaparin Injectable 40 milliGRAM(s) SubCutaneous every 24 hours  guaiFENesin  milliGRAM(s) Oral every 12 hours  metFORMIN 500 milliGRAM(s) Oral two times a day    LABS: All Labs Reviewed:                        14.8    )-----------( 292      ( 11 Sep 2018 23:28 )             44.3         139  |  106  |  10  ----------------------------<  123<H>  4.0   |  22  |  0.90    Ca    9.2      11 Sep 2018 23:28    TPro  8.3  /  Alb  3.8  /  TBili  0.3  /  DBili  x   /  AST  40<H>  /  ALT  72  /  AlkPhos  96      Blood Culture: pending  Urine culture: pending    CXR: official read pending however with right sided infiltrate concerning for pneumonia.     ASSESSMENT AND PLAN:     # Sepsis 2/2 CAP  # Acute Hypoxic Respiratory Distress   - continue with antibiotics to cover for gram negative bacterial infection and atypical coverage.   - start IV Rocephin and Azithromycin.   - follow up blood culture/ urine culture (UA appears clear) / sputum culture.   - add on urine antigens for Strep pneumo and legionella.   - negative RVP for influenza.   - will need ambulatory pulse ox prior to discharge if no improvement in pulse ox after treatment.   - add prn albuterol for bronchospasm.   - anti-tussives prn and anti-pyretics.   - repeat AM CBC.     # DM - Type II, continue Metformin home dose. Check AM A1c.   - no need for accuchecks. If not tolerating diet will stop.     DVT ppx: Lovenox   Dispo: admit to med-surg.   Anticipate dc in 1-2 days pending clinical improvement. Discussed w/ staff.   Total time > 75 mins

## 2018-09-13 LAB
CULTURE RESULTS: SIGNIFICANT CHANGE UP
HBA1C BLD-MCNC: 6.1 % — HIGH (ref 4–5.6)
HCT VFR BLD CALC: 38.3 % — SIGNIFICANT CHANGE UP (ref 34.5–45)
HGB BLD-MCNC: 12.4 G/DL — SIGNIFICANT CHANGE UP (ref 11.5–15.5)
MCHC RBC-ENTMCNC: 30 PG — SIGNIFICANT CHANGE UP (ref 27–34)
MCHC RBC-ENTMCNC: 32.4 GM/DL — SIGNIFICANT CHANGE UP (ref 32–36)
MCV RBC AUTO: 92.5 FL — SIGNIFICANT CHANGE UP (ref 80–100)
NRBC # BLD: 0 /100 WBCS — SIGNIFICANT CHANGE UP (ref 0–0)
PLATELET # BLD AUTO: 217 K/UL — SIGNIFICANT CHANGE UP (ref 150–400)
RBC # BLD: 4.14 M/UL — SIGNIFICANT CHANGE UP (ref 3.8–5.2)
RBC # FLD: 13.2 % — SIGNIFICANT CHANGE UP (ref 10.3–14.5)
SPECIMEN SOURCE: SIGNIFICANT CHANGE UP
WBC # BLD: 10.76 K/UL — HIGH (ref 3.8–10.5)
WBC # FLD AUTO: 10.76 K/UL — HIGH (ref 3.8–10.5)

## 2018-09-13 RX ORDER — IPRATROPIUM/ALBUTEROL SULFATE 18-103MCG
3 AEROSOL WITH ADAPTER (GRAM) INHALATION EVERY 6 HOURS
Qty: 0 | Refills: 0 | Status: DISCONTINUED | OUTPATIENT
Start: 2018-09-13 | End: 2018-09-16

## 2018-09-13 RX ADMIN — SODIUM CHLORIDE 100 MILLILITER(S): 9 INJECTION INTRAMUSCULAR; INTRAVENOUS; SUBCUTANEOUS at 17:25

## 2018-09-13 RX ADMIN — METFORMIN HYDROCHLORIDE 500 MILLIGRAM(S): 850 TABLET ORAL at 17:21

## 2018-09-13 RX ADMIN — Medication 650 MILLIGRAM(S): at 08:07

## 2018-09-13 RX ADMIN — Medication 600 MILLIGRAM(S): at 05:17

## 2018-09-13 RX ADMIN — Medication 650 MILLIGRAM(S): at 23:29

## 2018-09-13 RX ADMIN — SODIUM CHLORIDE 100 MILLILITER(S): 9 INJECTION INTRAMUSCULAR; INTRAVENOUS; SUBCUTANEOUS at 05:18

## 2018-09-13 RX ADMIN — CEFTRIAXONE 1000 MILLIGRAM(S): 500 INJECTION, POWDER, FOR SOLUTION INTRAMUSCULAR; INTRAVENOUS at 09:31

## 2018-09-13 RX ADMIN — Medication 100 MILLIGRAM(S): at 22:56

## 2018-09-13 RX ADMIN — ENOXAPARIN SODIUM 40 MILLIGRAM(S): 100 INJECTION SUBCUTANEOUS at 22:06

## 2018-09-13 RX ADMIN — Medication 650 MILLIGRAM(S): at 17:21

## 2018-09-13 RX ADMIN — METFORMIN HYDROCHLORIDE 500 MILLIGRAM(S): 850 TABLET ORAL at 05:17

## 2018-09-13 RX ADMIN — Medication 3 MILLILITER(S): at 20:21

## 2018-09-13 RX ADMIN — AZITHROMYCIN 500 MILLIGRAM(S): 500 TABLET, FILM COATED ORAL at 13:09

## 2018-09-13 RX ADMIN — Medication 600 MILLIGRAM(S): at 17:25

## 2018-09-13 NOTE — CONSULT NOTE ADULT - SUBJECTIVE AND OBJECTIVE BOX
Patient is a 47y old  Female who presents with a chief complaint of Cough, fever X 3 weeks (12 Sep 2018 08:25)    HPI:  This is a  46 y/o F w/ Hx DM not on insulin, obesity admitted on  for evaluation of fever, cough and shortness of breath over past 3 weeks; noted to be coughing in my presence. Has headache, sore throat and body aches as well; no sick contacts or travel.            PMH: as above  PSH: as above  Meds: per reconciliation sheet, noted below  MEDICATIONS  (STANDING):  azithromycin   Tablet 500 milliGRAM(s) Oral daily  cefTRIAXone Injectable. 1000 milliGRAM(s) IV Push every 24 hours  cefTRIAXone Injectable. 1000 milliGRAM(s) IV Push every 24 hours  enoxaparin Injectable 40 milliGRAM(s) SubCutaneous every 24 hours  guaiFENesin  milliGRAM(s) Oral every 12 hours  influenza   Vaccine 0.5 milliLiter(s) IntraMuscular once  metFORMIN 500 milliGRAM(s) Oral two times a day  sodium chloride 0.9%. 1000 milliLiter(s) (100 mL/Hr) IV Continuous <Continuous>    MEDICATIONS  (PRN):  acetaminophen   Tablet .. 650 milliGRAM(s) Oral every 6 hours PRN Mild Pain (1 - 3)  docusate sodium 100 milliGRAM(s) Oral two times a day PRN Constipation  ibuprofen  Tablet. 800 milliGRAM(s) Oral every 6 hours PRN Temp greater or equal to 38.5C (101.3F)  ondansetron Injectable 4 milliGRAM(s) IV Push every 4 hours PRN Nausea and/or Vomiting    Allergies    No Known Allergies    Intolerances      Social: no smoking, no alcohol, no illegal drugs; no recent travel, no exposure to TB  FAMILY HISTORY:     no history of premature cardiovascular disease in first degree relatives  ROS: the patient denies fever, no chills, no HA, no dizziness, no sore throat, no blurry vision, no CP, no palpitations,  no abdominal pain, no diarrhea, no N/V, no dysuria, no leg pain, no claudication, no rash, no joint aches, no rectal pain or bleeding, no night sweats  All other systems reviewed and are negative    Vital Signs Last 24 Hrs  T(C): 36.9 (13 Sep 2018 11:16), Max: 39 (12 Sep 2018 15:28)  T(F): 98.5 (13 Sep 2018 11:16), Max: 102.2 (12 Sep 2018 15:28)  HR: 102 (13 Sep 2018 11:16) (83 - 129)  BP: 106/68 (13 Sep 2018 11:16) (94/49 - 117/57)  BP(mean): --  RR: 17 (13 Sep 2018 11:16) (17 - 24)  SpO2: 98% (13 Sep 2018 11:16) (91% - 100%)  Daily     Daily     PE:    Constitutional: frail looking  HEENT: NC/AT, EOMI, PERRLA, conjunctivae clear; ears and nose atraumatic; pharynx clear  Neck: supple; thyroid not palpable  Back: no tenderness  Respiratory: respiratory effort normal; scattered coarse breath sounds  Cardiovascular: S1S2 regular, no murmurs  Abdomen: soft, not tender, not distended, positive BS; no liver or spleen organomegaly  Genitourinary: no suprapubic tenderness  Musculoskeletal: no muscle tenderness, no joint swelling or tenderness  Neurological/ Psychiatric: AxOx3, judgement and insight normal;  moving all extremities  Skin: no rashes; no palpable lesions    Labs: all available labs reviewed                        12.4   10.76 )-----------( 217      ( 13 Sep 2018 07:17 )             38.3     09-11    139  |  106  |  10  ----------------------------<  123<H>  4.0   |  22  |  0.90    Ca    9.2      11 Sep 2018 23:28    TPro  8.3  /  Alb  3.8  /  TBili  0.3  /  DBili  x   /  AST  40<H>  /  ALT  72  /  AlkPhos  96  09-11     LIVER FUNCTIONS - ( 11 Sep 2018 23:28 )  Alb: 3.8 g/dL / Pro: 8.3 gm/dL / ALK PHOS: 96 U/L / ALT: 72 U/L / AST: 40 U/L / GGT: x           Urinalysis Basic - ( 12 Sep 2018 03:02 )    Color: Yellow / Appearance: Clear / S.005 / pH: x  Gluc: x / Ketone: Negative  / Bili: Negative / Urobili: Negative mg/dL   Blood: x / Protein: Negative mg/dL / Nitrite: Negative   Leuk Esterase: Trace / RBC: 0-2 /HPF / WBC 3-5   Sq Epi: x / Non Sq Epi: Few / Bacteria: Few          Radiology: all available radiological tests reviewed    Advanced directives addressed: full resuscitation

## 2018-09-13 NOTE — CONSULT NOTE ADULT - ASSESSMENT
This is a  46 y/o F w/ Hx DM not on insulin, obesity admitted on 9/11 for evaluation of fever, cough and shortness of breath over past 3 weeks; noted to be coughing in my presence. Has headache, sore throat and body aches as well; no sick contacts or travel.  1. Patient admitted with pneumonia which will treat as community acquired pneumonia given that patient was not hospitalized in recent past and lives at home  - follow up cultures   - serial cbc and monitor temperature   - oxygen and nebs as needed   - reviewed prior medical records to evaluate for resistant or atypical pathogens   - iv hydration and supportive care   - agree with ceftriaxone and zithromax as ordered  2. other issues; diabetes, obesity   per medicine

## 2018-09-14 LAB
ANION GAP SERPL CALC-SCNC: 6 MMOL/L — SIGNIFICANT CHANGE UP (ref 5–17)
BUN SERPL-MCNC: 11 MG/DL — SIGNIFICANT CHANGE UP (ref 7–23)
CALCIUM SERPL-MCNC: 8.7 MG/DL — SIGNIFICANT CHANGE UP (ref 8.5–10.1)
CHLORIDE SERPL-SCNC: 111 MMOL/L — HIGH (ref 96–108)
CO2 SERPL-SCNC: 28 MMOL/L — SIGNIFICANT CHANGE UP (ref 22–31)
CREAT SERPL-MCNC: 0.72 MG/DL — SIGNIFICANT CHANGE UP (ref 0.5–1.3)
GLUCOSE SERPL-MCNC: 89 MG/DL — SIGNIFICANT CHANGE UP (ref 70–99)
HCT VFR BLD CALC: 35.9 % — SIGNIFICANT CHANGE UP (ref 34.5–45)
HGB BLD-MCNC: 11.6 G/DL — SIGNIFICANT CHANGE UP (ref 11.5–15.5)
MCHC RBC-ENTMCNC: 29.9 PG — SIGNIFICANT CHANGE UP (ref 27–34)
MCHC RBC-ENTMCNC: 32.3 GM/DL — SIGNIFICANT CHANGE UP (ref 32–36)
MCV RBC AUTO: 92.5 FL — SIGNIFICANT CHANGE UP (ref 80–100)
NRBC # BLD: 0 /100 WBCS — SIGNIFICANT CHANGE UP (ref 0–0)
PLATELET # BLD AUTO: 244 K/UL — SIGNIFICANT CHANGE UP (ref 150–400)
POTASSIUM SERPL-MCNC: 4 MMOL/L — SIGNIFICANT CHANGE UP (ref 3.5–5.3)
POTASSIUM SERPL-SCNC: 4 MMOL/L — SIGNIFICANT CHANGE UP (ref 3.5–5.3)
RBC # BLD: 3.88 M/UL — SIGNIFICANT CHANGE UP (ref 3.8–5.2)
RBC # FLD: 13.3 % — SIGNIFICANT CHANGE UP (ref 10.3–14.5)
SODIUM SERPL-SCNC: 145 MMOL/L — SIGNIFICANT CHANGE UP (ref 135–145)
WBC # BLD: 7.4 K/UL — SIGNIFICANT CHANGE UP (ref 3.8–10.5)
WBC # FLD AUTO: 7.4 K/UL — SIGNIFICANT CHANGE UP (ref 3.8–10.5)

## 2018-09-14 RX ADMIN — Medication 1 TABLET(S): at 13:02

## 2018-09-14 RX ADMIN — Medication 3 MILLILITER(S): at 08:26

## 2018-09-14 RX ADMIN — AZITHROMYCIN 500 MILLIGRAM(S): 500 TABLET, FILM COATED ORAL at 12:59

## 2018-09-14 RX ADMIN — SODIUM CHLORIDE 100 MILLILITER(S): 9 INJECTION INTRAMUSCULAR; INTRAVENOUS; SUBCUTANEOUS at 05:10

## 2018-09-14 RX ADMIN — Medication 3 MILLILITER(S): at 19:45

## 2018-09-14 RX ADMIN — SODIUM CHLORIDE 100 MILLILITER(S): 9 INJECTION INTRAMUSCULAR; INTRAVENOUS; SUBCUTANEOUS at 18:07

## 2018-09-14 RX ADMIN — METFORMIN HYDROCHLORIDE 500 MILLIGRAM(S): 850 TABLET ORAL at 18:00

## 2018-09-14 RX ADMIN — METFORMIN HYDROCHLORIDE 500 MILLIGRAM(S): 850 TABLET ORAL at 05:09

## 2018-09-14 RX ADMIN — Medication 100 MILLIGRAM(S): at 13:53

## 2018-09-14 RX ADMIN — Medication 600 MILLIGRAM(S): at 18:07

## 2018-09-14 RX ADMIN — Medication 100 MILLIGRAM(S): at 21:07

## 2018-09-14 RX ADMIN — CEFTRIAXONE 1000 MILLIGRAM(S): 500 INJECTION, POWDER, FOR SOLUTION INTRAMUSCULAR; INTRAVENOUS at 12:59

## 2018-09-14 RX ADMIN — Medication 3 MILLILITER(S): at 13:55

## 2018-09-14 RX ADMIN — Medication 100 MILLIGRAM(S): at 05:09

## 2018-09-14 RX ADMIN — Medication 3 MILLILITER(S): at 01:55

## 2018-09-14 RX ADMIN — Medication 650 MILLIGRAM(S): at 09:55

## 2018-09-14 RX ADMIN — Medication 600 MILLIGRAM(S): at 05:09

## 2018-09-14 RX ADMIN — ENOXAPARIN SODIUM 40 MILLIGRAM(S): 100 INJECTION SUBCUTANEOUS at 21:07

## 2018-09-15 LAB
GLUCOSE BLDC GLUCOMTR-MCNC: 92 MG/DL — SIGNIFICANT CHANGE UP (ref 70–99)
HCT VFR BLD CALC: 34.6 % — SIGNIFICANT CHANGE UP (ref 34.5–45)
HGB BLD-MCNC: 11.3 G/DL — LOW (ref 11.5–15.5)
MCHC RBC-ENTMCNC: 30.5 PG — SIGNIFICANT CHANGE UP (ref 27–34)
MCHC RBC-ENTMCNC: 32.7 GM/DL — SIGNIFICANT CHANGE UP (ref 32–36)
MCV RBC AUTO: 93.5 FL — SIGNIFICANT CHANGE UP (ref 80–100)
NRBC # BLD: 0 /100 WBCS — SIGNIFICANT CHANGE UP (ref 0–0)
PLATELET # BLD AUTO: 231 K/UL — SIGNIFICANT CHANGE UP (ref 150–400)
RBC # BLD: 3.7 M/UL — LOW (ref 3.8–5.2)
RBC # FLD: 13.2 % — SIGNIFICANT CHANGE UP (ref 10.3–14.5)
WBC # BLD: 5.73 K/UL — SIGNIFICANT CHANGE UP (ref 3.8–10.5)
WBC # FLD AUTO: 5.73 K/UL — SIGNIFICANT CHANGE UP (ref 3.8–10.5)

## 2018-09-15 PROCEDURE — 71250 CT THORAX DX C-: CPT | Mod: 26

## 2018-09-15 RX ADMIN — Medication 1 TABLET(S): at 00:16

## 2018-09-15 RX ADMIN — METFORMIN HYDROCHLORIDE 500 MILLIGRAM(S): 850 TABLET ORAL at 05:08

## 2018-09-15 RX ADMIN — METFORMIN HYDROCHLORIDE 500 MILLIGRAM(S): 850 TABLET ORAL at 17:31

## 2018-09-15 RX ADMIN — Medication 600 MILLIGRAM(S): at 05:08

## 2018-09-15 RX ADMIN — AZITHROMYCIN 500 MILLIGRAM(S): 500 TABLET, FILM COATED ORAL at 11:41

## 2018-09-15 RX ADMIN — Medication 40 MILLIGRAM(S): at 10:38

## 2018-09-15 RX ADMIN — CEFTRIAXONE 1000 MILLIGRAM(S): 500 INJECTION, POWDER, FOR SOLUTION INTRAMUSCULAR; INTRAVENOUS at 12:31

## 2018-09-15 RX ADMIN — SODIUM CHLORIDE 100 MILLILITER(S): 9 INJECTION INTRAMUSCULAR; INTRAVENOUS; SUBCUTANEOUS at 01:17

## 2018-09-15 RX ADMIN — Medication 600 MILLIGRAM(S): at 17:31

## 2018-09-15 RX ADMIN — SODIUM CHLORIDE 100 MILLILITER(S): 9 INJECTION INTRAMUSCULAR; INTRAVENOUS; SUBCUTANEOUS at 21:11

## 2018-09-15 RX ADMIN — Medication 3 MILLILITER(S): at 08:52

## 2018-09-15 RX ADMIN — ENOXAPARIN SODIUM 40 MILLIGRAM(S): 100 INJECTION SUBCUTANEOUS at 21:09

## 2018-09-15 RX ADMIN — Medication 100 MILLIGRAM(S): at 05:08

## 2018-09-15 RX ADMIN — Medication 100 MILLIGRAM(S): at 14:21

## 2018-09-15 RX ADMIN — Medication 100 MILLIGRAM(S): at 21:09

## 2018-09-15 RX ADMIN — Medication 3 MILLILITER(S): at 19:23

## 2018-09-15 RX ADMIN — Medication 1 TABLET(S): at 10:45

## 2018-09-15 RX ADMIN — SODIUM CHLORIDE 100 MILLILITER(S): 9 INJECTION INTRAMUSCULAR; INTRAVENOUS; SUBCUTANEOUS at 11:40

## 2018-09-15 RX ADMIN — Medication 3 MILLILITER(S): at 15:41

## 2018-09-15 RX ADMIN — Medication 3 MILLILITER(S): at 01:22

## 2018-09-16 ENCOUNTER — TRANSCRIPTION ENCOUNTER (OUTPATIENT)
Age: 48
End: 2018-09-16

## 2018-09-16 VITALS
DIASTOLIC BLOOD PRESSURE: 70 MMHG | TEMPERATURE: 98 F | RESPIRATION RATE: 17 BRPM | SYSTOLIC BLOOD PRESSURE: 113 MMHG | HEART RATE: 99 BPM | OXYGEN SATURATION: 95 %

## 2018-09-16 LAB
ANION GAP SERPL CALC-SCNC: 8 MMOL/L — SIGNIFICANT CHANGE UP (ref 5–17)
BUN SERPL-MCNC: 13 MG/DL — SIGNIFICANT CHANGE UP (ref 7–23)
CALCIUM SERPL-MCNC: 9 MG/DL — SIGNIFICANT CHANGE UP (ref 8.5–10.1)
CHLORIDE SERPL-SCNC: 110 MMOL/L — HIGH (ref 96–108)
CO2 SERPL-SCNC: 26 MMOL/L — SIGNIFICANT CHANGE UP (ref 22–31)
CREAT SERPL-MCNC: 0.7 MG/DL — SIGNIFICANT CHANGE UP (ref 0.5–1.3)
GLUCOSE SERPL-MCNC: 109 MG/DL — HIGH (ref 70–99)
HCT VFR BLD CALC: 37.1 % — SIGNIFICANT CHANGE UP (ref 34.5–45)
HGB BLD-MCNC: 12.2 G/DL — SIGNIFICANT CHANGE UP (ref 11.5–15.5)
MCHC RBC-ENTMCNC: 30.3 PG — SIGNIFICANT CHANGE UP (ref 27–34)
MCHC RBC-ENTMCNC: 32.9 GM/DL — SIGNIFICANT CHANGE UP (ref 32–36)
MCV RBC AUTO: 92.1 FL — SIGNIFICANT CHANGE UP (ref 80–100)
NRBC # BLD: 0 /100 WBCS — SIGNIFICANT CHANGE UP (ref 0–0)
PLATELET # BLD AUTO: 279 K/UL — SIGNIFICANT CHANGE UP (ref 150–400)
POTASSIUM SERPL-MCNC: 3.9 MMOL/L — SIGNIFICANT CHANGE UP (ref 3.5–5.3)
POTASSIUM SERPL-SCNC: 3.9 MMOL/L — SIGNIFICANT CHANGE UP (ref 3.5–5.3)
RBC # BLD: 4.03 M/UL — SIGNIFICANT CHANGE UP (ref 3.8–5.2)
RBC # FLD: 13 % — SIGNIFICANT CHANGE UP (ref 10.3–14.5)
SODIUM SERPL-SCNC: 144 MMOL/L — SIGNIFICANT CHANGE UP (ref 135–145)
WBC # BLD: 8.49 K/UL — SIGNIFICANT CHANGE UP (ref 3.8–10.5)
WBC # FLD AUTO: 8.49 K/UL — SIGNIFICANT CHANGE UP (ref 3.8–10.5)

## 2018-09-16 RX ORDER — ALBUTEROL 90 UG/1
2 AEROSOL, METERED ORAL
Qty: 1 | Refills: 0
Start: 2018-09-16 | End: 2018-10-15

## 2018-09-16 RX ORDER — ACETAMINOPHEN 500 MG
2 TABLET ORAL
Qty: 0 | Refills: 0 | DISCHARGE
Start: 2018-09-16

## 2018-09-16 RX ORDER — CEFUROXIME AXETIL 250 MG
1 TABLET ORAL
Qty: 14 | Refills: 0
Start: 2018-09-16 | End: 2018-09-22

## 2018-09-16 RX ADMIN — Medication 40 MILLIGRAM(S): at 06:23

## 2018-09-16 RX ADMIN — METFORMIN HYDROCHLORIDE 500 MILLIGRAM(S): 850 TABLET ORAL at 06:23

## 2018-09-16 RX ADMIN — Medication 3 MILLILITER(S): at 08:34

## 2018-09-16 RX ADMIN — SODIUM CHLORIDE 100 MILLILITER(S): 9 INJECTION INTRAMUSCULAR; INTRAVENOUS; SUBCUTANEOUS at 06:23

## 2018-09-16 RX ADMIN — Medication 600 MILLIGRAM(S): at 06:23

## 2018-09-16 RX ADMIN — Medication 100 MILLIGRAM(S): at 08:38

## 2018-09-16 RX ADMIN — CEFTRIAXONE 1000 MILLIGRAM(S): 500 INJECTION, POWDER, FOR SOLUTION INTRAMUSCULAR; INTRAVENOUS at 14:13

## 2018-09-16 RX ADMIN — Medication 100 MILLIGRAM(S): at 14:13

## 2018-09-16 RX ADMIN — AZITHROMYCIN 500 MILLIGRAM(S): 500 TABLET, FILM COATED ORAL at 14:12

## 2018-09-16 RX ADMIN — INFLUENZA VIRUS VACCINE 0.5 MILLILITER(S): 15; 15; 15; 15 SUSPENSION INTRAMUSCULAR at 14:16

## 2018-09-16 RX ADMIN — Medication 3 MILLILITER(S): at 02:33

## 2018-09-16 NOTE — DISCHARGE NOTE ADULT - PLAN OF CARE
resolution of infection -Complete course of antibiotics  -Follow up with PCP within 2 weeks of discharge -2.6cm R thyroid nodule noted on CT scan  -Recommend outpatient workup  -Follow up with PCP within 2 weeks of discharge

## 2018-09-16 NOTE — DISCHARGE NOTE ADULT - MEDICATION SUMMARY - MEDICATIONS TO CHANGE
Pt. Resting comfortably in bed at this time with call bell in reach. Pt. Updated on plan of care. I will SWITCH the dose or number of times a day I take the medications listed below when I get home from the hospital:  None

## 2018-09-16 NOTE — PROGRESS NOTE ADULT - SUBJECTIVE AND OBJECTIVE BOX
INTERVAL HPI/OVERNIGHT EVENTS:  This is a pleasant 48 y/o F w/ Hx DM not on insulin presented to the ED complaining of ongoing fevers, productive cough and shortness of breath for the past 3 weeks. She took Tylenol and Motrin without any improvement. She denies sick contacts or recent hospitalizations within the past 90 days. Patient also complains of headache, sinus pressure, sore throat and chest pain with rigourous coughing.     In the ED, Tmax 103.1, notable labs include mildly elevated WBC 11K, otherwise unremarkable UA and CMP. CXR done with concern for right sided pneumonia. Patient noted to desat to 92% on RA with prolonged conversation. She was given sepsis protocol with 3L IVFs NS + antibiotics: Vanc and Zosyn with admission requested     18- Patient seen and examined at bedside. Patient states she feels ok. Complains of sweats and HA. Patient also complaining of abdominal pain, worse with coughing.     MEDICATIONS  (STANDING):  ALBUTerol/ipratropium for Nebulization 3 milliLiter(s) Nebulizer every 6 hours  azithromycin   Tablet 500 milliGRAM(s) Oral daily  benzonatate 100 milliGRAM(s) Oral every 8 hours  cefTRIAXone Injectable. 1000 milliGRAM(s) IV Push every 24 hours  enoxaparin Injectable 40 milliGRAM(s) SubCutaneous every 24 hours  guaiFENesin  milliGRAM(s) Oral every 12 hours  influenza   Vaccine 0.5 milliLiter(s) IntraMuscular once  metFORMIN 500 milliGRAM(s) Oral two times a day  sodium chloride 0.9%. 1000 milliLiter(s) (100 mL/Hr) IV Continuous <Continuous>    MEDICATIONS  (PRN):  acetaminophen   Tablet .. 650 milliGRAM(s) Oral every 6 hours PRN Mild Pain (1 - 3)  docusate sodium 100 milliGRAM(s) Oral two times a day PRN Constipation  ibuprofen  Tablet. 800 milliGRAM(s) Oral every 6 hours PRN Temp greater or equal to 38.5C (101.3F)  ondansetron Injectable 4 milliGRAM(s) IV Push every 4 hours PRN Nausea and/or Vomiting      Allergies    No Known Allergies    Intolerances      ROS:  CONSTITUTIONAL: + sweating  EYES/ENT: No visual changes;  No vertigo or throat pain   NECK: No pain or stiffness  RESPIRATORY: +cough, no SOB  CARDIOVASCULAR: No chest pain or palpitations  GASTROINTESTINAL: +abdominal pain  GENITOURINARY: No dysuria, frequency or hematuria  NEUROLOGICAL: + HA  SKIN: No itching, burning, rashes, or lesions   All other review of systems is negative unless indicated above.    Vital Signs Last 24 Hrs  T(C): 38.1 (13 Sep 2018 17:20), Max: 38.1 (13 Sep 2018 17:20)  T(F): 100.5 (13 Sep 2018 17:20), Max: 100.5 (13 Sep 2018 17:20)  HR: 106 (13 Sep 2018 17:20) (83 - 106)  BP: 124/70 (13 Sep 2018 17:20) (105/68 - 124/70)  BP(mean): --  RR: 17 (13 Sep 2018 17:20) (17 - 18)  SpO2: 97% (13 Sep 2018 17:20) (97% - 100%)     @ 07:01  -   @ 07:00  --------------------------------------------------------  IN: 100 mL / OUT: 0 mL / NET: 100 mL      Physical Exam:  General: WN/WD NAD  Neurology: A&Ox3, nonfocal, SWAIN x 4  Respiratory: CTA B/L  CV: RRR, S1S2, no murmurs, rubs or gallops  Abdominal: Soft, NT, ND +BS  Extremities: No edema, + peripheral pulses      LABS:                        12.4   10.76 )-----------( 217      ( 13 Sep 2018 07:17 )             38.3     09-11    139  |  106  |  10  ----------------------------<  123<H>  4.0   |  22  |  0.90    Ca    9.2      11 Sep 2018 23:28    TPro  8.3  /  Alb  3.8  /  TBili  0.3  /  DBili  x   /  AST  40<H>  /  ALT  72  /  AlkPhos  96  09-11      Urinalysis Basic - ( 12 Sep 2018 03:02 )    Color: Yellow / Appearance: Clear / S.005 / pH: x  Gluc: x / Ketone: Negative  / Bili: Negative / Urobili: Negative mg/dL   Blood: x / Protein: Negative mg/dL / Nitrite: Negative   Leuk Esterase: Trace / RBC: 0-2 /HPF / WBC 3-5   Sq Epi: x / Non Sq Epi: Few / Bacteria: Few        RADIOLOGY & ADDITIONAL TESTS:
INTERVAL HPI/OVERNIGHT EVENTS: This is a pleasant 46 y/o F w/ Hx DM not on insulin presented to the ED complaining of ongoing fevers, productive cough and shortness of breath for the past 3 weeks. She took Tylenol and Motrin without any improvement. She denies sick contacts or recent hospitalizations within the past 90 days. Patient also complains of headache, sinus pressure, sore throat and chest pain with rigourous coughing.     In the ED, Tmax 103.1, notable labs include mildly elevated WBC 11K, otherwise unremarkable UA and CMP. CXR done with concern for right sided pneumonia. Patient noted to desat to 92% on RA with prolonged conversation. She was given sepsis protocol with 3L IVFs NS + antibiotics: Vanc and Zosyn with admission requested     9/13/18- Patient seen and examined at bedside. Patient states she feels ok. Complains of sweats and HA. Patient also complaining of abdominal pain, worse with coughing.   9/14/18- Patient seen and examined at bedside. Patient still complaining of cough, but states breathing is improving, especially with neb treatments. Patient still complaining of HA- wraps around entire head, mild improvement with tylenol      MEDICATIONS  (STANDING):  ALBUTerol/ipratropium for Nebulization 3 milliLiter(s) Nebulizer every 6 hours  azithromycin   Tablet 500 milliGRAM(s) Oral daily  benzonatate 100 milliGRAM(s) Oral every 8 hours  cefTRIAXone Injectable. 1000 milliGRAM(s) IV Push every 24 hours  enoxaparin Injectable 40 milliGRAM(s) SubCutaneous every 24 hours  guaiFENesin  milliGRAM(s) Oral every 12 hours  influenza   Vaccine 0.5 milliLiter(s) IntraMuscular once  metFORMIN 500 milliGRAM(s) Oral two times a day  sodium chloride 0.9%. 1000 milliLiter(s) (100 mL/Hr) IV Continuous <Continuous>    MEDICATIONS  (PRN):  acetaminophen   Tablet .. 650 milliGRAM(s) Oral every 6 hours PRN Mild Pain (1 - 3)  acetaminophen 325 mG/butalbital 50 mG/caffeine 40 mG 1 Tablet(s) Oral every 6 hours PRN headache  docusate sodium 100 milliGRAM(s) Oral two times a day PRN Constipation  ibuprofen  Tablet. 800 milliGRAM(s) Oral every 6 hours PRN Temp greater or equal to 38.5C (101.3F)  ondansetron Injectable 4 milliGRAM(s) IV Push every 4 hours PRN Nausea and/or Vomiting      Allergies    No Known Allergies    Intolerances      ROS:  CONSTITUTIONAL: + weakness, no fevers or chills  EYES/ENT: No visual changes;  No vertigo or throat pain   NECK: No pain or stiffness  RESPIRATORY: + cough, no wheezing, hemoptysis; + shortness of breath on exertion  CARDIOVASCULAR: No chest pain or palpitations  GASTROINTESTINAL: No abdominal or epigastric pain. No nausea, vomiting, or hematemesis; No diarrhea or constipation. No melena or hematochezia.  GENITOURINARY: No dysuria, frequency or hematuria  NEUROLOGICAL: No numbness  SKIN: No itching, burning, rashes, or lesions   All other review of systems is negative unless indicated above.    Vital Signs Last 24 Hrs  T(C): 36.6 (14 Sep 2018 17:08), Max: 37.6 (13 Sep 2018 23:04)  T(F): 97.9 (14 Sep 2018 17:08), Max: 99.6 (13 Sep 2018 23:04)  HR: 85 (14 Sep 2018 17:08) (79 - 105)  BP: 100/45 (14 Sep 2018 17:08) (100/45 - 118/76)  BP(mean): --  RR: 18 (14 Sep 2018 17:08) (16 - 18)  SpO2: 100% (14 Sep 2018 17:08) (96% - 100%)    09-13 @ 07:01  -  09-14 @ 07:00  --------------------------------------------------------  IN: 1000 mL / OUT: 0 mL / NET: 1000 mL      Physical Exam:  General: WN/WD NAD  Neurology: A&Ox3, nonfocal, SWAIN x 4  Respiratory: + coarse breath sounds RLL and LLL  CV: RRR, S1S2, no murmurs, rubs or gallops  Abdominal: Soft, NT, ND +BS  Extremities: No edema, + peripheral pulses      LABS:                        11.6   7.40  )-----------( 244      ( 14 Sep 2018 06:37 )             35.9     09-14    145  |  111<H>  |  11  ----------------------------<  89  4.0   |  28  |  0.72    Ca    8.7      14 Sep 2018 06:37            RADIOLOGY & ADDITIONAL TESTS:
INTERVAL HPI/OVERNIGHT EVENTS: This is a pleasant 46 y/o F w/ Hx DM not on insulin presented to the ED complaining of ongoing fevers, productive cough and shortness of breath for the past 3 weeks. She took Tylenol and Motrin without any improvement. She denies sick contacts or recent hospitalizations within the past 90 days. Patient also complains of headache, sinus pressure, sore throat and chest pain with rigourous coughing.     In the ED, Tmax 103.1, notable labs include mildly elevated WBC 11K, otherwise unremarkable UA and CMP. CXR done with concern for right sided pneumonia. Patient noted to desat to 92% on RA with prolonged conversation. She was given sepsis protocol with 3L IVFs NS + antibiotics: Vanc and Zosyn with admission requested     9/13/18- Patient seen and examined at bedside. Patient states she feels ok. Complains of sweats and HA. Patient also complaining of abdominal pain, worse with coughing.   9/14/18- Patient seen and examined at bedside. Patient still complaining of cough, but states breathing is improving, especially with neb treatments. Patient still complaining of HA- wraps around entire head, mild improvement with tylenol  9/15/18- Patient seen and examined at bedside. States she is feeling better, but still has the cough with sputum production. Overall better. No other complaints at this time.    MEDICATIONS  (STANDING):  ALBUTerol/ipratropium for Nebulization 3 milliLiter(s) Nebulizer every 6 hours  azithromycin   Tablet 500 milliGRAM(s) Oral daily  benzonatate 100 milliGRAM(s) Oral every 8 hours  cefTRIAXone Injectable. 1000 milliGRAM(s) IV Push every 24 hours  enoxaparin Injectable 40 milliGRAM(s) SubCutaneous every 24 hours  guaiFENesin  milliGRAM(s) Oral every 12 hours  influenza   Vaccine 0.5 milliLiter(s) IntraMuscular once  metFORMIN 500 milliGRAM(s) Oral two times a day  sodium chloride 0.9%. 1000 milliLiter(s) (100 mL/Hr) IV Continuous <Continuous>    MEDICATIONS  (PRN):  acetaminophen   Tablet .. 650 milliGRAM(s) Oral every 6 hours PRN Mild Pain (1 - 3)  acetaminophen 325 mG/butalbital 50 mG/caffeine 40 mG 1 Tablet(s) Oral every 6 hours PRN headache  docusate sodium 100 milliGRAM(s) Oral two times a day PRN Constipation  ibuprofen  Tablet. 800 milliGRAM(s) Oral every 6 hours PRN Temp greater or equal to 38.5C (101.3F)  ondansetron Injectable 4 milliGRAM(s) IV Push every 4 hours PRN Nausea and/or Vomiting      Allergies    No Known Allergies    Intolerances        ROS:  CONSTITUTIONAL: + weakness, no fevers or chills  EYES/ENT: No visual changes;  No vertigo or throat pain   NECK: No pain or stiffness  RESPIRATORY: + cough, no wheezing, hemoptysis, no SOB  CARDIOVASCULAR: No chest pain or palpitations  GASTROINTESTINAL: No abdominal or epigastric pain. No nausea, vomiting, or hematemesis; No diarrhea or constipation. No melena or hematochezia.  GENITOURINARY: No dysuria, frequency or hematuria  NEUROLOGICAL: No numbness  SKIN: No itching, burning, rashes, or lesions   All other review of systems is negative unless indicated above.    Vital Signs Last 24 Hrs  T(C): 36.8 (15 Sep 2018 16:24), Max: 36.8 (15 Sep 2018 16:24)  T(F): 98.3 (15 Sep 2018 16:24), Max: 98.3 (15 Sep 2018 16:24)  HR: 99 (15 Sep 2018 16:24) (76 - 99)  BP: 133/70 (15 Sep 2018 16:24) (100/59 - 133/70)  BP(mean): --  RR: 18 (15 Sep 2018 16:24) (16 - 18)  SpO2: 99% (15 Sep 2018 16:24) (96% - 99%)    09-14 @ 07:01  -  09-15 @ 07:00  --------------------------------------------------------  IN: 1000 mL / OUT: 0 mL / NET: 1000 mL        Physical Exam:  General: WN/WD NAD  Neurology: A&Ox3, nonfocal, SWAIN x 4  Respiratory: + coarse breath sounds RLL and LLL  CV: RRR, S1S2, no murmurs, rubs or gallops  Abdominal: Soft, NT, ND +BS  Extremities: No edema, + peripheral pulses      LABS:                        11.3   5.73  )-----------( 231      ( 15 Sep 2018 08:01 )             34.6     09-14    145  |  111<H>  |  11  ----------------------------<  89  4.0   |  28  |  0.72    Ca    8.7      14 Sep 2018 06:37            RADIOLOGY & ADDITIONAL TESTS:
Patient is a 47y old  Female who presents with a chief complaint of Cough, fever X 3 weeks (12 Sep 2018 08:25)      Date of service: 18 @ 13:43      Patient with less cough  Lying in bed, still with oxygen via nasal cannula  Still intermittently febrile    ROS: no  chills; denies dizziness, no HA, no abdominal pain, no diarrhea or constipation; no dysuria, no urinary frequency, no legs pain, no rashes    MEDICATIONS  (STANDING):  ALBUTerol/ipratropium for Nebulization 3 milliLiter(s) Nebulizer every 6 hours  azithromycin   Tablet 500 milliGRAM(s) Oral daily  benzonatate 100 milliGRAM(s) Oral every 8 hours  cefTRIAXone Injectable. 1000 milliGRAM(s) IV Push every 24 hours  enoxaparin Injectable 40 milliGRAM(s) SubCutaneous every 24 hours  guaiFENesin  milliGRAM(s) Oral every 12 hours  influenza   Vaccine 0.5 milliLiter(s) IntraMuscular once  metFORMIN 500 milliGRAM(s) Oral two times a day  sodium chloride 0.9%. 1000 milliLiter(s) (100 mL/Hr) IV Continuous <Continuous>    MEDICATIONS  (PRN):  acetaminophen   Tablet .. 650 milliGRAM(s) Oral every 6 hours PRN Mild Pain (1 - 3)  acetaminophen 325 mG/butalbital 50 mG/caffeine 40 mG 1 Tablet(s) Oral every 6 hours PRN headache  docusate sodium 100 milliGRAM(s) Oral two times a day PRN Constipation  ibuprofen  Tablet. 800 milliGRAM(s) Oral every 6 hours PRN Temp greater or equal to 38.5C (101.3F)  ondansetron Injectable 4 milliGRAM(s) IV Push every 4 hours PRN Nausea and/or Vomiting      Vital Signs Last 24 Hrs  T(C): 36.7 (14 Sep 2018 12:01), Max: 38.1 (13 Sep 2018 17:20)  T(F): 98.1 (14 Sep 2018 12:01), Max: 100.5 (13 Sep 2018 17:20)  HR: 86 (14 Sep 2018 12:01) (79 - 106)  BP: 111/65 (14 Sep 2018 12:01) (102/58 - 124/70)  BP(mean): --  RR: 17 (14 Sep 2018 12:01) (16 - 17)  SpO2: 96% (14 Sep 2018 12:01) (96% - 99%)    Physical Exam:        PE:    Constitutional: frail looking  HEENT: NC/AT, EOMI, PERRLA, conjunctivae clear; ears and nose atraumatic; pharynx clear  Neck: supple; thyroid not palpable  Back: no tenderness  Respiratory: respiratory effort normal; scattered coarse breath sounds  Cardiovascular: S1S2 regular, no murmurs  Abdomen: soft, not tender, not distended, positive BS; no liver or spleen organomegaly  Genitourinary: no suprapubic tenderness  Musculoskeletal: no muscle tenderness, no joint swelling or tenderness  Neurological/ Psychiatric: AxOx3, judgement and insight normal;  moving all extremities  Skin: no rashes; no palpable lesions    Labs: all available labs reviewed                        12.4   1076 )-----------( 217      ( 13 Sep 2018 07:17 )             38.3         139  |  106  |  10  ----------------------------<  123<H>  4.0   |  22  |  0.90    Ca    9.2      11 Sep 2018 23:28    TPro  8.3  /  Alb  3.8  /  TBili  0.3  /  DBili  x   /  AST  40<H>  /  ALT  72  /  AlkPhos  96       LIVER FUNCTIONS - ( 11 Sep 2018 23:28 )  Alb: 3.8 g/dL / Pro: 8.3 gm/dL / ALK PHOS: 96 U/L / ALT: 72 U/L / AST: 40 U/L / GGT: x           Urinalysis Basic - ( 12 Sep 2018 03:02 )    Color: Yellow / Appearance: Clear / S.005 / pH: x  Gluc: x / Ketone: Negative  / Bili: Negative / Urobili: Negative mg/dL   Blood: x / Protein: Negative mg/dL / Nitrite: Negative   Leuk Esterase: Trace / RBC: 0-2 /HPF / WBC 3-5   Sq Epi: x / Non Sq Epi: Few / Bacteria: Few          Radiology: all available radiological tests reviewed    Advanced directives addressed: full resuscitation
Patient is a 47y old  Female who presents with a chief complaint of Cough, fever X 3 weeks (12 Sep 2018 08:25)  Date of service: 09-16-18 @ 15:01    Patient doing well  Less cough  Afebrile        ROS: no fever or chills; denies dizziness, no HA,  no abdominal pain, no diarrhea or constipation; no dysuria, no urinary frequency, no legs pain, no rashes    MEDICATIONS  (STANDING):  ALBUTerol/ipratropium for Nebulization 3 milliLiter(s) Nebulizer every 6 hours  azithromycin   Tablet 500 milliGRAM(s) Oral daily  benzonatate 100 milliGRAM(s) Oral every 8 hours  enoxaparin Injectable 40 milliGRAM(s) SubCutaneous every 24 hours  guaiFENesin  milliGRAM(s) Oral every 12 hours  metFORMIN 500 milliGRAM(s) Oral two times a day  predniSONE   Tablet 40 milliGRAM(s) Oral daily  sodium chloride 0.9%. 1000 milliLiter(s) (100 mL/Hr) IV Continuous <Continuous>    MEDICATIONS  (PRN):  acetaminophen   Tablet .. 650 milliGRAM(s) Oral every 6 hours PRN Mild Pain (1 - 3)  acetaminophen 325 mG/butalbital 50 mG/caffeine 40 mG 1 Tablet(s) Oral every 6 hours PRN headache  docusate sodium 100 milliGRAM(s) Oral two times a day PRN Constipation  ibuprofen  Tablet. 800 milliGRAM(s) Oral every 6 hours PRN Temp greater or equal to 38.5C (101.3F)  ondansetron Injectable 4 milliGRAM(s) IV Push every 4 hours PRN Nausea and/or Vomiting      Vital Signs Last 24 Hrs  T(C): 36.6 (16 Sep 2018 11:38), Max: 36.8 (15 Sep 2018 16:24)  T(F): 97.8 (16 Sep 2018 11:38), Max: 98.3 (15 Sep 2018 16:24)  HR: 99 (16 Sep 2018 11:38) (75 - 99)  BP: 113/70 (16 Sep 2018 11:38) (98/52 - 133/70)  BP(mean): --  RR: 17 (16 Sep 2018 11:38) (17 - 18)  SpO2: 95% (16 Sep 2018 11:38) (95% - 99%)    Physical Exam:      PE:    Constitutional: frail looking  HEENT: NC/AT, EOMI, PERRLA, conjunctivae clear; ears and nose atraumatic; pharynx clear  Neck: supple; thyroid not palpable  Back: no tenderness  Respiratory: respiratory effort normal; scattered wheeze  Cardiovascular: S1S2 regular, no murmurs  Abdomen: soft, not tender, not distended, positive BS; no liver or spleen organomegaly  Genitourinary: no suprapubic tenderness  Musculoskeletal: no muscle tenderness, no joint swelling or tenderness  Neurological/ Psychiatric: AxOx3, judgement and insight normal;  moving all extremities  Skin: no rashes; no palpable lesions    Labs: all available labs reviewed                 Labs:                        11.3   5.73  )-----------( 231      ( 15 Sep 2018 08:01 )             34.6     09-14    145  |  111<H>  |  11  ----------------------------<  89  4.0   |  28  |  0.72    Ca    8.7      14 Sep 2018 06:37             Cultures:       Culture - Urine (collected 09-12-18 @ 03:02)  Source: .Urine None  Final Report (09-13-18 @ 10:10):    <10,000 CFU/ml Normal Urogenital elio present    Culture - Blood (collected 09-11-18 @ 23:28)  Source: .Blood Blood-Venous  Preliminary Report (09-13-18 @ 10:02):    No growth to date.    Culture - Blood (collected 09-11-18 @ 23:28)  Source: .Blood Blood-Peripheral  Preliminary Report (09-13-18 @ 10:02):    No growth to date.            Radiology: all available radiological tests reviewed    Advanced directives addressed: full resuscitation
Patient is a 47y old  Female who presents with a chief complaint of Cough, fever X 3 weeks (12 Sep 2018 08:25)    Date of service: 09-15-18 @ 13:16    Patient still with cough  States she is much better  Afebrile        ROS: no fever or chills; denies dizziness, no HA, no SOB, no abdominal pain, no diarrhea or constipation; no dysuria, no urinary frequency, no legs pain, no rashes    MEDICATIONS  (STANDING):  ALBUTerol/ipratropium for Nebulization 3 milliLiter(s) Nebulizer every 6 hours  azithromycin   Tablet 500 milliGRAM(s) Oral daily  benzonatate 100 milliGRAM(s) Oral every 8 hours  cefTRIAXone Injectable. 1000 milliGRAM(s) IV Push every 24 hours  enoxaparin Injectable 40 milliGRAM(s) SubCutaneous every 24 hours  guaiFENesin  milliGRAM(s) Oral every 12 hours  influenza   Vaccine 0.5 milliLiter(s) IntraMuscular once  metFORMIN 500 milliGRAM(s) Oral two times a day  sodium chloride 0.9%. 1000 milliLiter(s) (100 mL/Hr) IV Continuous <Continuous>    MEDICATIONS  (PRN):  acetaminophen   Tablet .. 650 milliGRAM(s) Oral every 6 hours PRN Mild Pain (1 - 3)  acetaminophen 325 mG/butalbital 50 mG/caffeine 40 mG 1 Tablet(s) Oral every 6 hours PRN headache  docusate sodium 100 milliGRAM(s) Oral two times a day PRN Constipation  ibuprofen  Tablet. 800 milliGRAM(s) Oral every 6 hours PRN Temp greater or equal to 38.5C (101.3F)  ondansetron Injectable 4 milliGRAM(s) IV Push every 4 hours PRN Nausea and/or Vomiting      Vital Signs Last 24 Hrs  T(C): 36.6 (15 Sep 2018 12:30), Max: 36.6 (14 Sep 2018 17:08)  T(F): 97.9 (15 Sep 2018 12:30), Max: 97.9 (14 Sep 2018 17:08)  HR: 87 (15 Sep 2018 12:30) (85 - 93)  BP: 119/71 (15 Sep 2018 12:30) (100/45 - 119/71)  BP(mean): --  RR: 16 (15 Sep 2018 12:30) (16 - 18)  SpO2: 96% (15 Sep 2018 12:30) (96% - 100%)    Physical Exam:      PE:    Constitutional: frail looking  HEENT: NC/AT, EOMI, PERRLA, conjunctivae clear; ears and nose atraumatic; pharynx clear  Neck: supple; thyroid not palpable  Back: no tenderness  Respiratory: respiratory effort normal; scattered wheeze  Cardiovascular: S1S2 regular, no murmurs  Abdomen: soft, not tender, not distended, positive BS; no liver or spleen organomegaly  Genitourinary: no suprapubic tenderness  Musculoskeletal: no muscle tenderness, no joint swelling or tenderness  Neurological/ Psychiatric: AxOx3, judgement and insight normal;  moving all extremities  Skin: no rashes; no palpable lesions    Labs: all available labs reviewed                 Labs:                        11.3   5.73  )-----------( 231      ( 15 Sep 2018 08:01 )             34.6     09-14    145  |  111<H>  |  11  ----------------------------<  89  4.0   |  28  |  0.72    Ca    8.7      14 Sep 2018 06:37             Cultures:       Culture - Urine (collected 09-12-18 @ 03:02)  Source: .Urine None  Final Report (09-13-18 @ 10:10):    <10,000 CFU/ml Normal Urogenital elio present    Culture - Blood (collected 09-11-18 @ 23:28)  Source: .Blood Blood-Venous  Preliminary Report (09-13-18 @ 10:02):    No growth to date.    Culture - Blood (collected 09-11-18 @ 23:28)  Source: .Blood Blood-Peripheral  Preliminary Report (09-13-18 @ 10:02):    No growth to date.            Radiology: all available radiological tests reviewed    Advanced directives addressed: full resuscitation

## 2018-09-16 NOTE — DISCHARGE NOTE ADULT - CARE PLAN
Principal Discharge DX:	PNA (pneumonia)  Goal:	resolution of infection  Assessment and plan of treatment:	-Complete course of antibiotics  -Follow up with PCP within 2 weeks of discharge  Secondary Diagnosis:	Thyroid nodule  Assessment and plan of treatment:	-2.6cm R thyroid nodule noted on CT scan  -Recommend outpatient workup  -Follow up with PCP within 2 weeks of discharge

## 2018-09-16 NOTE — DISCHARGE NOTE ADULT - HOSPITAL COURSE
This is a pleasant 48 y/o F w/ Hx DM not on insulin presented to the ED complaining of ongoing fevers, productive cough and shortness of breath for the past 3 weeks. She took Tylenol and Motrin without any improvement. She denies sick contacts or recent hospitalizations within the past 90 days. Patient also complains of headache, sinus pressure, sore throat and chest pain with rigourous coughing.     In the ED, Tmax 103.1, notable labs include mildly elevated WBC 11K, otherwise unremarkable UA and CMP. CXR done with concern for right sided pneumonia. Patient noted to desat to 92% on RA with prolonged conversation. She was given sepsis protocol with 3L IVFs NS + antibiotics: Vanc and Zosyn with admission requested     9/13/18- Patient seen and examined at bedside. Patient states she feels ok. Complains of sweats and HA. Patient also complaining of abdominal pain, worse with coughing.   9/14/18- Patient seen and examined at bedside. Patient still complaining of cough, but states breathing is improving, especially with neb treatments. Patient still complaining of HA- wraps around entire head, mild improvement with tylenol  9/15/18- Patient seen and examined at bedside. States she is feeling better, but still has the cough with sputum production. Overall better. No other complaints at this time.  9/16/18- Patient seen and examined. States she feels well, states cough is more controlled. No complaints at this time.    Physical Exam on day of discharge:  General: WN/WD NAD  Neurology: A&Ox3, nonfocal, SWAIN x 4  Respiratory: + mild coarse breath sounds RLL and LLL  CV: RRR, S1S2, no murmurs, rubs or gallops  Abdominal: Soft, NT, ND +BS  Extremities: No edema, + peripheral pulses    # Sepsis 2/2 CAP  # Acute Hypoxic Respiratory Distress   - complete course of abx  - follow up blood culture/ urine culture (UA appears clear)    - add on urine antigens for Strep pneumo and legionella- neg  - negative RVP for influenza.   - add albuterol for bronchospasm.   - anti-tussives prn and anti-pyretics.   - repeat AM CBC.   - ID recs appreciated  -CT Chest done- showing B/L lower lobe PNA   -Started on steroids    #R thyroid lobe nodule  -2.6cm thyroid nodule seen on CT  -Recommend outpatient workup    #HA  -possible migraine  -Started on fioricet- good response    # DM - Type II, continue Metformin home dose. Check AM A1c- 6.1     Case discussed with patient, ID. Patient medically stable for discharge at this time with close follow up with PCP.  Total time spent on discharge: 48 minutes

## 2018-09-16 NOTE — DISCHARGE NOTE ADULT - ADDITIONAL INSTRUCTIONS
-Complete course of antibiotics  -Follow up with PCP within 2 weeks of discharge  -2.6cm R thyroid nodule noted on CT scan  -Recommend outpatient workup

## 2018-09-16 NOTE — DISCHARGE NOTE ADULT - MEDICATION SUMMARY - MEDICATIONS TO TAKE
I will START or STAY ON the medications listed below when I get home from the hospital:    predniSONE 20 mg oral tablet  -- 2 tab(s) by mouth once a day  -- Indication: For PNA (pneumonia)    acetaminophen 325 mg oral tablet  -- 2 tab(s) by mouth every 6 hours, As needed, Mild Pain (1 - 3)  -- Indication: For fever/pain    butalbital/acetaminophen/caffeine 50 mg-325 mg-40 mg oral tablet  -- 1 tab(s) by mouth every 6 hours, As needed, headache  -- Indication: For headache    metFORMIN 500 mg oral tablet  -- 1 tab(s) by mouth 2 times a day  -- Indication: For prediabetes    benzonatate 100 mg oral capsule  -- 1 cap(s) by mouth every 8 hours  -- Indication: For cough    ProAir HFA 90 mcg/inh inhalation aerosol  -- 2 puff(s) inhaled 4 times a day, As Needed -for shortness of breath and/or wheezing   -- For inhalation only.  It is very important that you take or use this exactly as directed.  Do not skip doses or discontinue unless directed by your doctor.  Obtain medical advice before taking any non-prescription drugs as some may affect the action of this medication.  Shake well before use.    -- Indication: For PNA (pneumonia)    cefuroxime 500 mg oral tablet  -- 1 tab(s) by mouth every 12 hours   -- Finish all this medication unless otherwise directed by prescriber.  Medication should be taken with plenty of water.  Take with food or milk.    -- Indication: For PNA (pneumonia)    guaiFENesin 600 mg oral tablet, extended release  -- 1 tab(s) by mouth every 12 hours, As Needed -for cough   -- Indication: For cough I will START or STAY ON the medications listed below when I get home from the hospital:    predniSONE 20 mg oral tablet  -- 2 tab(s) by mouth once a day  -- Indication: For PNA (pneumonia)    acetaminophen 325 mg oral tablet  -- 2 tab(s) by mouth every 6 hours, As needed, Mild Pain (1 - 3)  -- Indication: For Pain    butalbital/acetaminophen/caffeine 50 mg-325 mg-40 mg oral tablet  -- 1 tab(s) by mouth every 6 hours, As needed, headache  -- Indication: For headache    metFORMIN 500 mg oral tablet  -- 1 tab(s) by mouth 2 times a day  -- Indication: For diabetes    benzonatate 100 mg oral capsule  -- 1 cap(s) by mouth every 8 hours  -- Indication: For cough    ProAir HFA 90 mcg/inh inhalation aerosol  -- 2 puff(s) inhaled 4 times a day, As Needed -for shortness of breath and/or wheezing   -- For inhalation only.  It is very important that you take or use this exactly as directed.  Do not skip doses or discontinue unless directed by your doctor.  Obtain medical advice before taking any non-prescription drugs as some may affect the action of this medication.  Shake well before use.    -- Indication: For PNA (pneumonia)    cefuroxime 500 mg oral tablet  -- 1 tab(s) by mouth every 12 hours   -- Finish all this medication unless otherwise directed by prescriber.  Medication should be taken with plenty of water.  Take with food or milk.    -- Indication: For PNA (pneumonia)    guaiFENesin 600 mg oral tablet, extended release  -- 1 tab(s) by mouth every 12 hours, As Needed -for cough   -- Indication: For cough

## 2018-09-16 NOTE — PROGRESS NOTE ADULT - ASSESSMENT
This is a  48 y/o F w/ Hx DM not on insulin, obesity admitted on 9/11 for evaluation of fever, cough and shortness of breath over past 3 weeks; noted to be coughing in my presence. Has headache, sore throat and body aches as well; no sick contacts or travel.  1. Patient admitted with pneumonia which will treat as community acquired pneumonia given that patient was not hospitalized in recent past and lives at home  - follow up cultures   - serial cbc and monitor temperature   - oxygen and nebs as needed   - reviewed prior medical records to evaluate for resistant or atypical pathogens   - iv hydration and supportive care   - day #3 ceftriaxone and zithromax  - okay from id standpoint to discharge home on 7 more days ceftin 500 mg po q 12 hours  - tolerating antibiotics without rashes or side effects   2. other issues; diabetes, obesity   per medicine
# Sepsis 2/2 CAP  # Acute Hypoxic Respiratory Distress   - continue with antibiotics to cover for gram negative bacterial infection and atypical coverage.   - continue IV Rocephin and Azithromycin.   - follow up blood culture/ urine culture (UA appears clear)    - add on urine antigens for Strep pneumo and legionella- neg  - negative RVP for influenza.   - add albuterol for bronchospasm.   - anti-tussives prn and anti-pyretics.   - repeat AM CBC.   - ID recs appreciated  -CT Chest done- showing B/L lower lobe PNA   -Started on steroids      #R thyroid lobe nodule  -2.6cm thyroid nodule seen on CT  -Recommend outpatient workup    #HA  -possible migraine  -Started on fioricet- good response    # DM - Type II, continue Metformin home dose. Check AM A1c- 6.1   - no need for accuchecks. If not tolerating diet will stop.     DVT ppx: Lovenox     Dispo- likely D/C in AM
# Sepsis 2/2 CAP  # Acute Hypoxic Respiratory Distress   - continue with antibiotics to cover for gram negative bacterial infection and atypical coverage.   - continue IV Rocephin and Azithromycin.   - follow up blood culture/ urine culture (UA appears clear)    - add on urine antigens for Strep pneumo and legionella.   - negative RVP for influenza.   - will need ambulatory pulse ox prior to discharge if no improvement in pulse ox after treatment.   - add albuterol for bronchospasm.   - anti-tussives prn and anti-pyretics.   - repeat AM CBC.   - ID recs appreciated    #HA  -possible migraine  -Started on fioricet- good response    # DM - Type II, continue Metformin home dose. Check AM A1c- 6.1   - no need for accuchecks. If not tolerating diet will stop.     DVT ppx: Lovenox
# Sepsis 2/2 CAP  # Acute Hypoxic Respiratory Distress   - continue with antibiotics to cover for gram negative bacterial infection and atypical coverage.   - start IV Rocephin and Azithromycin.   - follow up blood culture/ urine culture (UA appears clear)    - add on urine antigens for Strep pneumo and legionella.   - negative RVP for influenza.   - will need ambulatory pulse ox prior to discharge if no improvement in pulse ox after treatment.   - add albuterol for bronchospasm.   - anti-tussives prn and anti-pyretics.   - repeat AM CBC.     # DM - Type II, continue Metformin home dose. Check AM A1c- 6.1   - no need for accuchecks. If not tolerating diet will stop.     DVT ppx: Lovenox
This is a  46 y/o F w/ Hx DM not on insulin, obesity admitted on 9/11 for evaluation of fever, cough and shortness of breath over past 3 weeks; noted to be coughing in my presence. Has headache, sore throat and body aches as well; no sick contacts or travel.  1. Patient admitted with pneumonia which will treat as community acquired pneumonia given that patient was not hospitalized in recent past and lives at home  - follow up cultures   - serial cbc and monitor temperature   - oxygen and nebs as needed   - reviewed prior medical records to evaluate for resistant or atypical pathogens   - iv hydration and supportive care   - day #2 ceftriaxone and zithromax  - tolerating antibiotics without rashes or side effects   2. other issues; diabetes, obesity   per medicine
This is a  46 y/o F w/ Hx DM not on insulin, obesity admitted on 9/11 for evaluation of fever, cough and shortness of breath over past 3 weeks; noted to be coughing in my presence. Has headache, sore throat and body aches as well; no sick contacts or travel.  1. Patient admitted with pneumonia which will treat as community acquired pneumonia given that patient was not hospitalized in recent past and lives at home  - follow up cultures   - serial cbc and monitor temperature   - oxygen and nebs as needed   - reviewed prior medical records to evaluate for resistant or atypical pathogens   - iv hydration and supportive care   - day #4 ceftriaxone and zithromax  - okay from id standpoint to discharge home on 7 more days ceftin 500 mg po q 12 hours  - tolerating antibiotics without rashes or side effects   2. other issues; diabetes, obesity   per medicine

## 2018-09-16 NOTE — DISCHARGE NOTE ADULT - PATIENT PORTAL LINK FT
You can access the [facility] Patient Portal, offered by [facility], by registering with the following website: [email or web address]

## 2018-09-16 NOTE — PROGRESS NOTE ADULT - REASON FOR ADMISSION
Cough, fever X 3 weeks

## 2018-09-16 NOTE — DISCHARGE NOTE ADULT - CARE PROVIDER_API CALL
Lucia Angeles), Family Medicine  [address] [place] [address]  Phone: [phone]  Fax: [phone]

## 2018-09-17 LAB
CULTURE RESULTS: SIGNIFICANT CHANGE UP
CULTURE RESULTS: SIGNIFICANT CHANGE UP
SPECIMEN SOURCE: SIGNIFICANT CHANGE UP
SPECIMEN SOURCE: SIGNIFICANT CHANGE UP

## 2018-09-25 DIAGNOSIS — E66.9 OBESITY, UNSPECIFIED: ICD-10-CM

## 2018-09-25 DIAGNOSIS — A41.9 SEPSIS, UNSPECIFIED ORGANISM: ICD-10-CM

## 2018-09-25 DIAGNOSIS — Z90.49 ACQUIRED ABSENCE OF OTHER SPECIFIED PARTS OF DIGESTIVE TRACT: ICD-10-CM

## 2018-09-25 DIAGNOSIS — R06.03 ACUTE RESPIRATORY DISTRESS: ICD-10-CM

## 2018-09-25 DIAGNOSIS — R05 COUGH: ICD-10-CM

## 2018-09-25 DIAGNOSIS — J18.9 PNEUMONIA, UNSPECIFIED ORGANISM: ICD-10-CM

## 2018-09-25 DIAGNOSIS — G43.909 MIGRAINE, UNSPECIFIED, NOT INTRACTABLE, WITHOUT STATUS MIGRAINOSUS: ICD-10-CM

## 2018-09-25 DIAGNOSIS — E04.1 NONTOXIC SINGLE THYROID NODULE: ICD-10-CM

## 2018-09-27 ENCOUNTER — APPOINTMENT (OUTPATIENT)
Dept: RADIOLOGY | Facility: CLINIC | Age: 48
End: 2018-09-27
Payer: COMMERCIAL

## 2018-09-27 ENCOUNTER — OUTPATIENT (OUTPATIENT)
Dept: OUTPATIENT SERVICES | Facility: HOSPITAL | Age: 48
LOS: 1 days | End: 2018-09-27
Payer: SELF-PAY

## 2018-09-27 ENCOUNTER — APPOINTMENT (OUTPATIENT)
Dept: ULTRASOUND IMAGING | Facility: CLINIC | Age: 48
End: 2018-09-27
Payer: COMMERCIAL

## 2018-09-27 DIAGNOSIS — Z00.8 ENCOUNTER FOR OTHER GENERAL EXAMINATION: ICD-10-CM

## 2018-09-27 DIAGNOSIS — Z90.49 ACQUIRED ABSENCE OF OTHER SPECIFIED PARTS OF DIGESTIVE TRACT: Chronic | ICD-10-CM

## 2018-09-27 PROCEDURE — 76536 US EXAM OF HEAD AND NECK: CPT | Mod: 26

## 2018-09-27 PROCEDURE — 71046 X-RAY EXAM CHEST 2 VIEWS: CPT | Mod: 26

## 2018-09-27 PROCEDURE — 71046 X-RAY EXAM CHEST 2 VIEWS: CPT

## 2018-09-27 PROCEDURE — 76536 US EXAM OF HEAD AND NECK: CPT

## 2018-10-10 ENCOUNTER — OUTPATIENT (OUTPATIENT)
Dept: OUTPATIENT SERVICES | Facility: HOSPITAL | Age: 48
LOS: 1 days | End: 2018-10-10
Payer: SELF-PAY

## 2018-10-10 ENCOUNTER — APPOINTMENT (OUTPATIENT)
Dept: RADIOLOGY | Facility: CLINIC | Age: 48
End: 2018-10-10
Payer: COMMERCIAL

## 2018-10-10 DIAGNOSIS — Z00.8 ENCOUNTER FOR OTHER GENERAL EXAMINATION: ICD-10-CM

## 2018-10-10 DIAGNOSIS — Z90.49 ACQUIRED ABSENCE OF OTHER SPECIFIED PARTS OF DIGESTIVE TRACT: Chronic | ICD-10-CM

## 2018-10-10 PROCEDURE — 71046 X-RAY EXAM CHEST 2 VIEWS: CPT | Mod: 26

## 2018-10-10 PROCEDURE — 71046 X-RAY EXAM CHEST 2 VIEWS: CPT

## 2018-11-15 ENCOUNTER — APPOINTMENT (OUTPATIENT)
Dept: ENDOCRINOLOGY | Facility: HOSPITAL | Age: 48
End: 2018-11-15

## 2019-01-08 ENCOUNTER — RESULT REVIEW (OUTPATIENT)
Age: 49
End: 2019-01-08

## 2019-04-20 NOTE — ED ADULT TRIAGE NOTE - CCCP TRG CHIEF CMPLNT
Home Oxygen Evaluation    Date Performed: 2019    1) Patient's Home O2 Sat on room air, while at rest:  96      If O2 sats on room air at rest are 88% or below, patient qualifies. No additional testing needed. Document N/A in steps 2 and 3. If 89% or above, complete steps 2.      2) Patient's O2 Sat on room air while exercisin        If O2 sats on room air while exercising remain 89% or above patient does not qualify, no further testing needed Document N/A in step 3. If O2 sats on room air while exercising are 88% or below, continue to step 3.      3) Patient's O2 Sat while exercising on O2: 96 at 2 LPM         (Must show improvement from #2 for patients to qualify)    If O2 sats improve on oxygen, patient qualifies for portable oxygen. If not, the patient does not qualify.         cough/chest discomfort

## 2019-04-22 ENCOUNTER — OUTPATIENT (OUTPATIENT)
Dept: OUTPATIENT SERVICES | Facility: HOSPITAL | Age: 49
LOS: 1 days | End: 2019-04-22
Payer: COMMERCIAL

## 2019-04-22 ENCOUNTER — APPOINTMENT (OUTPATIENT)
Dept: MAMMOGRAPHY | Facility: CLINIC | Age: 49
End: 2019-04-22
Payer: COMMERCIAL

## 2019-04-22 ENCOUNTER — APPOINTMENT (OUTPATIENT)
Dept: ULTRASOUND IMAGING | Facility: CLINIC | Age: 49
End: 2019-04-22
Payer: COMMERCIAL

## 2019-04-22 ENCOUNTER — APPOINTMENT (OUTPATIENT)
Dept: MAMMOGRAPHY | Facility: CLINIC | Age: 49
End: 2019-04-22

## 2019-04-22 DIAGNOSIS — Z90.49 ACQUIRED ABSENCE OF OTHER SPECIFIED PARTS OF DIGESTIVE TRACT: Chronic | ICD-10-CM

## 2019-04-22 DIAGNOSIS — Z00.8 ENCOUNTER FOR OTHER GENERAL EXAMINATION: ICD-10-CM

## 2019-04-22 PROCEDURE — 77067 SCR MAMMO BI INCL CAD: CPT | Mod: 26

## 2019-04-22 PROCEDURE — 77067 SCR MAMMO BI INCL CAD: CPT

## 2019-04-22 PROCEDURE — 76536 US EXAM OF HEAD AND NECK: CPT | Mod: 26

## 2019-04-22 PROCEDURE — 77063 BREAST TOMOSYNTHESIS BI: CPT | Mod: 26

## 2019-04-22 PROCEDURE — 77063 BREAST TOMOSYNTHESIS BI: CPT

## 2019-04-22 PROCEDURE — 76536 US EXAM OF HEAD AND NECK: CPT

## 2019-06-06 ENCOUNTER — APPOINTMENT (OUTPATIENT)
Dept: COLORECTAL SURGERY | Facility: CLINIC | Age: 49
End: 2019-06-06

## 2019-07-18 ENCOUNTER — APPOINTMENT (OUTPATIENT)
Dept: ENDOCRINOLOGY | Facility: HOSPITAL | Age: 49
End: 2019-07-18

## 2019-07-18 ENCOUNTER — OUTPATIENT (OUTPATIENT)
Dept: OUTPATIENT SERVICES | Facility: HOSPITAL | Age: 49
LOS: 1 days | End: 2019-07-18
Payer: SELF-PAY

## 2019-07-18 VITALS
SYSTOLIC BLOOD PRESSURE: 129 MMHG | DIASTOLIC BLOOD PRESSURE: 79 MMHG | BODY MASS INDEX: 47.17 KG/M2 | HEART RATE: 62 BPM | WEIGHT: 234 LBS | HEIGHT: 59 IN

## 2019-07-18 DIAGNOSIS — E11.49 TYPE 2 DIABETES MELLITUS WITH OTHER DIABETIC NEUROLOGICAL COMPLICATION: ICD-10-CM

## 2019-07-18 DIAGNOSIS — E04.1 NONTOXIC SINGLE THYROID NODULE: ICD-10-CM

## 2019-07-18 DIAGNOSIS — Z90.49 ACQUIRED ABSENCE OF OTHER SPECIFIED PARTS OF DIGESTIVE TRACT: Chronic | ICD-10-CM

## 2019-07-18 DIAGNOSIS — E06.9 THYROIDITIS, UNSPECIFIED: ICD-10-CM

## 2019-07-18 LAB
ALBUMIN SERPL ELPH-MCNC: 4.8 G/DL — SIGNIFICANT CHANGE UP (ref 3.3–5)
ALP SERPL-CCNC: 89 U/L — SIGNIFICANT CHANGE UP (ref 40–120)
ALT FLD-CCNC: 85 U/L — HIGH (ref 10–45)
ANION GAP SERPL CALC-SCNC: 13 MMOL/L — SIGNIFICANT CHANGE UP (ref 5–17)
AST SERPL-CCNC: 65 U/L — HIGH (ref 10–40)
BILIRUB SERPL-MCNC: 0.3 MG/DL — SIGNIFICANT CHANGE UP (ref 0.2–1.2)
BUN SERPL-MCNC: 10 MG/DL — SIGNIFICANT CHANGE UP (ref 7–23)
CALCIUM SERPL-MCNC: 9.9 MG/DL — SIGNIFICANT CHANGE UP (ref 8.4–10.5)
CHLORIDE SERPL-SCNC: 108 MMOL/L — SIGNIFICANT CHANGE UP (ref 96–108)
CO2 SERPL-SCNC: 24 MMOL/L — SIGNIFICANT CHANGE UP (ref 22–31)
CREAT SERPL-MCNC: 0.71 MG/DL — SIGNIFICANT CHANGE UP (ref 0.5–1.3)
GLUCOSE SERPL-MCNC: 101 MG/DL — HIGH (ref 70–99)
POTASSIUM SERPL-MCNC: 4.6 MMOL/L — SIGNIFICANT CHANGE UP (ref 3.5–5.3)
POTASSIUM SERPL-SCNC: 4.6 MMOL/L — SIGNIFICANT CHANGE UP (ref 3.5–5.3)
PROT SERPL-MCNC: 7.6 G/DL — SIGNIFICANT CHANGE UP (ref 6–8.3)
SODIUM SERPL-SCNC: 144 MMOL/L — SIGNIFICANT CHANGE UP (ref 135–145)
T3 SERPL-MCNC: 132 NG/DL — SIGNIFICANT CHANGE UP (ref 80–200)
T4 FREE SERPL-MCNC: 1 NG/DL — SIGNIFICANT CHANGE UP (ref 0.9–1.8)
TSH SERPL-MCNC: 1.84 UIU/ML — SIGNIFICANT CHANGE UP (ref 0.27–4.2)

## 2019-07-18 PROCEDURE — 82043 UR ALBUMIN QUANTITATIVE: CPT

## 2019-07-18 PROCEDURE — G0463: CPT

## 2019-07-18 PROCEDURE — 84480 ASSAY TRIIODOTHYRONINE (T3): CPT

## 2019-07-18 PROCEDURE — 84443 ASSAY THYROID STIM HORMONE: CPT

## 2019-07-18 PROCEDURE — 84439 ASSAY OF FREE THYROXINE: CPT

## 2019-07-18 PROCEDURE — 80053 COMPREHEN METABOLIC PANEL: CPT

## 2019-07-18 RX ORDER — METFORMIN ER 500 MG 500 MG/1
500 TABLET ORAL
Refills: 0 | Status: ACTIVE | COMMUNITY
Start: 2019-07-18

## 2019-07-18 NOTE — REASON FOR VISIT
[Consultation] : a consultation visit [[employer] ] : provided by [employer]   [FreeTextEntry1] : [id number] [FreeTextEntry2] : Luisana

## 2019-07-18 NOTE — END OF VISIT
[] : Fellow [FreeTextEntry3] : 49 y/o female with large isoechoic right 2.5 cm thyroid nodule, image viewed on PACS without suspicious features; however needs FNA based on size. Will refer to IR for biopsy. T2DM well controlled but has morbid obesity, dietary counselling provided and will see if she is interested in GLP-1 agent and inquire with [facility] savings directory to see if she qualifies for 1 year of GLP-1 agents due to financial constraints.

## 2019-07-18 NOTE — PHYSICAL EXAM
[Alert] : alert [No Acute Distress] : no acute distress [Well Nourished] : well nourished [Well Developed] : well developed [PERRL] : pupils equal, round and reactive to light [No Proptosis] : no proptosis [No Lid Lag] : no lid lag [Normal Oropharynx] : the oropharynx was normal [No Respiratory Distress] : no respiratory distress [No Accessory Muscle Use] : no accessory muscle use [Clear to Auscultation] : lungs were clear to auscultation bilaterally [Normal S1, S2] : normal S1 and S2 [Regular Rhythm] : with a regular rhythm [Not Tender] : non-tender [Soft] : abdomen soft [Not Distended] : not distended [Normal Gait] : normal gait [No Rash] : no rash [No Skin Lesions] : no skin lesions [Right Foot Was Examined] : right foot ~C was examined [Left Foot Was Examined] : left foot ~C was examined [2+] : 2+ in the dorsalis pedis [Normal Reflexes] : deep tendon reflexes were 2+ and symmetric [No Tremors] : no tremors [Oriented x3] : oriented to person, place, and time [Normal Affect] : the affect was normal [Normal Mood] : the mood was normal [Healthy Appearance] : healthy appearance [Normal Voice/Communication] : normal voice communication [Normal Hearing] : hearing was normal [Normal Rate] : heart rate was normal  [Normal Bowel Sounds] : normal bowel sounds [Normal] : normal and non tender [Foot Ulcers] : no foot ulcers [Acanthosis Nigricans] : no acanthosis nigricans [Delayed in the Right Toes] : normal in the toes [Full ROM] : with limited range of motion [Swelling] : not swollen [Tenderness] : not tender [Erythema] : not erythematous [Delayed in the Left Toes] : normal in the toes [de-identified] : obese female [de-identified] : palpable Rt sided thyroid nodule, soft on exam [de-identified] : Trace pedal edema

## 2019-07-18 NOTE — HISTORY OF PRESENT ILLNESS
[FreeTextEntry1] : 48 year old female with PMH of DMT2, PUD, Thyroid nodule here for new consultation for thyroid nodule.  \par \par Thyroid nodule:\par Patient was noted to have thyroid nodule last year in Sept 2018, underwent thyroid US which showed homogenous thyroid gland with 2.5 cm right mid to lower pole thyroid nodule, isoechoic solid with central cystic differentiation. TFTs at that time were in normal range, TSH 1.23 and free T4 of 1.3. Thyroid US repeated in April 2019 and showed stable nodule. TSH in april 2019 was 1.68. \par Patient currently does complain of occasional palpitations, dry skin, hair loss and constipation. Reports weight gain of 14 lbs in past 4 months. No tremors. No dysphagia but occasionally reports losing voice while talking. No history of radiation to neck. No past use of amiodarone or lithium. Says her niece has some thyroid problems.\par \par DM:\par Patient is diagnosed with T2DM in 2015, Hba1c was 7.9% at that time, most recent in April 2019 is 5.8%. Currently on metformin 500 mg twice a day. Does not have glucometer and does not check blood sugars at home. Eats 2 meals a day and snacks in between. Says does not eat high carbohydrate diet, tries to avoid rice and tortilla or alternates it. Does complain of polyuria and polydipsia. No known macrovascular complications. Does reports neuropathy. No nephropathy or retinopathy reported. Last seen by ophthalmologist in 2018 and no known retinopathy. Have not seen any podiatrist.

## 2019-07-18 NOTE — DATA REVIEWED
[FreeTextEntry1] : Sept 2018:\par Thyroid US: Homogenous thyroid gland with 2.5 cm right mid to lower pole thyroid nodule, isoechoic solid with central cystic differentiation. TFTs at that time were in normal range\par TSH :1.23 and free T4: 1.3. \par \par April 2019:\par Thyroid US repeated and showed stable isoechoic nodule, 2.5 cm\par TSH: 1.68. \par \par HbA1c : 5.8% (april 2019)\par \par Lipid profile (april 2019)\par total cholesterol: 147\par Triglycerides:81\par HDL: 57\par LDL: 74

## 2019-07-18 NOTE — ASSESSMENT
[FreeTextEntry1] : 48 year old female with PMH of T2DM (HbA1c 5.8%, well controlled without known complications), Thyroid nodule here for new consultation for thyroid nodule. Also has morbid obesity.\par \par 1. Thyroid nodule:\par - 2.5 cm right mid to lower pole thyroid nodule (isoechoic solid with central cystic differentiation) noted on thyroid US in Sept 2018 which was stable on repeat US in April 2019. \par - Given the size and features, patient needs FNA, referral given for IR\par - Patient has been euthyroid over the past year, will repeat TFTs and follow up with the results. \par \par 2. T2DM\par - Most recent HbA1c 5.8%, well controlled \par - On metformin 500 mg twice a day, managed by PCP\par - Counseled about diet, exercise and weight loss\par - Will repeat Hba1c today, continue metformin at current dose. \par - Patient has morbid obesity and will not be able to afford GLP-1 agents but will ask her about it and see if prescription."[employer] will qualify her for GLP-1 therapy x 1 year due to financial restrictions\par - Recommended patient to follow up with ophthalmologist and podiatrist. \par \par - BP well controlled, not on any medications.\par - Check urine microalbumin/creatinine \par \par - LDL goal < 70, was 74 in april 2019, not on any medications, check annually. \par \par Discussed with Dr. Vivian DO. \par \par Follow up in 6 months or earlier if indicated. \par  [Carbohydrate Consistent Diet] : carbohydrate consistent diet [Diabetes Foot Care] : diabetes foot care [Long Term Vascular Complications] : long term vascular complications of diabetes [Importance of Diet and Exercise] : importance of diet and exercise to improve glycemic control, achieve weight loss and improve cardiovascular health [Retinopathy Screening] : Patient was referred to ophthalmology for retinopathy screening

## 2019-07-18 NOTE — REVIEW OF SYSTEMS
[Fatigue] : fatigue [Recent Weight Gain (___ Lbs)] : recent [unfilled] ~Ulb weight gain [Constipation] : constipation [Palpitations] : palpitations [Lower Ext Edema] : lower extremity edema [SOB on Exertion] : shortness of breath during exertion [Abdominal Pain] : abdominal pain [Heartburn] : heartburn [Polyuria] : polyuria [Irregular Menses] : irregular menses [Nocturia] : nocturia [Joint Pain] : joint pain [Myalgia] : myalgia  [Back Pain] : back pain [Hair Loss] : hair loss [Dry Skin] : dry skin [Pain/Numbness of Digits] : pain/numbness of digits [Polydipsia] : polydipsia [Heat Intolerance] : heat intolerant [Swelling] : swelling [Negative] : Psychiatric [Decreased Appetite] : appetite not decreased [Fever] : no fever [Chills] : no chills [Blurry Vision] : blurred vision [Dysphagia] : no dysphagia [Dysphonia] : no dysphonia [Chest Pain] : no chest pain [Shortness Of Breath] : no shortness of breath [Cough] : no cough [Orthopnea] : no orthopnea [Nausea] : no nausea [Vomiting] : no vomiting was observed [Diarrhea] : no diarrhea [Dysuria] : no dysuria [Pelvic Pain] : no pelvic pain [Ulcer] : no ulcer [Headache] : no headaches [Tremors] : no tremors [Dizziness] : no dizziness [Cushing's Stigmata] : no Cushing's stigmata [All other systems negative] : All other systems negative [FreeTextEntry3] : occasional blurry vision

## 2019-07-19 DIAGNOSIS — E04.1 NONTOXIC SINGLE THYROID NODULE: ICD-10-CM

## 2019-07-19 DIAGNOSIS — E11.49 TYPE 2 DIABETES MELLITUS WITH OTHER DIABETIC NEUROLOGICAL COMPLICATION: ICD-10-CM

## 2019-07-19 LAB
CREAT ?TM UR-MCNC: 88 MG/DL — SIGNIFICANT CHANGE UP
MICROALBUMIN UR-MCNC: <1.2 MG/DL — SIGNIFICANT CHANGE UP
MICROALBUMIN/CREAT UR-RTO: SIGNIFICANT CHANGE UP MG/G (ref 0–30)

## 2019-09-09 ENCOUNTER — OUTPATIENT (OUTPATIENT)
Dept: OUTPATIENT SERVICES | Facility: HOSPITAL | Age: 49
LOS: 1 days | End: 2019-09-09
Payer: SELF-PAY

## 2019-09-09 ENCOUNTER — APPOINTMENT (OUTPATIENT)
Dept: ULTRASOUND IMAGING | Facility: CLINIC | Age: 49
End: 2019-09-09
Payer: COMMERCIAL

## 2019-09-09 ENCOUNTER — RESULT REVIEW (OUTPATIENT)
Age: 49
End: 2019-09-09

## 2019-09-09 DIAGNOSIS — Z90.49 ACQUIRED ABSENCE OF OTHER SPECIFIED PARTS OF DIGESTIVE TRACT: Chronic | ICD-10-CM

## 2019-09-09 DIAGNOSIS — E04.1 NONTOXIC SINGLE THYROID NODULE: ICD-10-CM

## 2019-09-09 PROCEDURE — 10005 FNA BX W/US GDN 1ST LES: CPT

## 2020-03-19 ENCOUNTER — APPOINTMENT (OUTPATIENT)
Dept: COLORECTAL SURGERY | Facility: CLINIC | Age: 50
End: 2020-03-19

## 2020-03-31 ENCOUNTER — EMERGENCY (EMERGENCY)
Facility: HOSPITAL | Age: 50
LOS: 0 days | Discharge: HOME | End: 2020-03-31
Payer: MEDICAID

## 2020-03-31 VITALS
DIASTOLIC BLOOD PRESSURE: 72 MMHG | OXYGEN SATURATION: 98 % | RESPIRATION RATE: 18 BRPM | TEMPERATURE: 98 F | SYSTOLIC BLOOD PRESSURE: 131 MMHG | HEART RATE: 90 BPM

## 2020-03-31 DIAGNOSIS — R05 COUGH: ICD-10-CM

## 2020-03-31 DIAGNOSIS — Z90.49 ACQUIRED ABSENCE OF OTHER SPECIFIED PARTS OF DIGESTIVE TRACT: Chronic | ICD-10-CM

## 2020-03-31 DIAGNOSIS — R51 HEADACHE: ICD-10-CM

## 2020-03-31 DIAGNOSIS — B97.29 OTHER CORONAVIRUS AS THE CAUSE OF DISEASES CLASSIFIED ELSEWHERE: ICD-10-CM

## 2020-03-31 DIAGNOSIS — R50.9 FEVER, UNSPECIFIED: ICD-10-CM

## 2020-03-31 PROCEDURE — 87635 SARS-COV-2 COVID-19 AMP PRB: CPT

## 2020-03-31 PROCEDURE — 99283 EMERGENCY DEPT VISIT LOW MDM: CPT

## 2020-03-31 RX ORDER — ACETAMINOPHEN 500 MG
1 TABLET ORAL
Qty: 60 | Refills: 0
Start: 2020-03-31

## 2020-03-31 NOTE — ED STATDOCS - OBJECTIVE STATEMENT
Pt presents to ED with subjective fever, cough, body aches, HA x 3 days. Pt unknown if recently exposed to COVID-19. States family has recently had similar symptoms. Pt here for testing.

## 2020-03-31 NOTE — ED STATDOCS - PATIENT PORTAL LINK FT
You can access the FollowMyHealth Patient Portal offered by [facility] by registering at the following website: [email or web address]. By joining [employer]’s FollowMyHealth portal, you will also be able to view your health information using other applications (apps) compatible with our system.

## 2020-03-31 NOTE — ED STATDOCS - NS ED ROS FT
ROS: Constitutional- +fever, -chills.  Respiratory- +cough, -SOB  Cardiac- no chest pain, no palpitations, ENT- -rhinorrhea, no sore throat, no congestion. +HA Abdomen- No nausea, no vomiting, no diarrhea.  Urinary- no dysuria, no urgency, no frequency.  Skin- No rashes

## 2020-03-31 NOTE — ED ADULT NURSE NOTE - OBJECTIVE STATEMENT
PATIENT WAS TREATED, EVALUATED AND DISCHARGED BY INTAKE PROVIDER. PLEASE SEE PROVIDER NOTE FOR ASSESSMENT.  DISCHARGE INSTRUCTIONS REVIEWED WITH PATIENT VERBALLY, PT VERBALIZED UNDERSTANDING OF DISCHARGE INSTRUCTIONS. PAPER COPY OF DISCHARGE INSTRUCTIONS GIVEN TO PATIENT WITH SELF MUSA AND COVID 19 INFORMATION.

## 2020-03-31 NOTE — ED ADULT TRIAGE NOTE - CHIEF COMPLAINT QUOTE
PATIENT WAS TREATED, EVALUATED AND DISCHARGED BY INTAKE PROVIDER. PLEASE SEE PROVIDER NOTE FOR ASSESSMENT.

## 2020-04-02 LAB — SARS-COV-2 RNA SPEC QL NAA+PROBE: (no result)

## 2020-11-13 ENCOUNTER — EMERGENCY (EMERGENCY)
Facility: HOSPITAL | Age: 50
LOS: 0 days | Discharge: HOME | End: 2020-11-14
Attending: EMERGENCY MEDICINE
Payer: COMMERCIAL

## 2020-11-13 VITALS
RESPIRATION RATE: 18 BRPM | WEIGHT: 175.05 LBS | DIASTOLIC BLOOD PRESSURE: 73 MMHG | TEMPERATURE: 98 F | SYSTOLIC BLOOD PRESSURE: 129 MMHG | HEART RATE: 90 BPM | HEIGHT: 62 IN | OXYGEN SATURATION: 99 %
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
## 2020-11-13 DIAGNOSIS — E11.9 TYPE 2 DIABETES MELLITUS WITHOUT COMPLICATIONS: ICD-10-CM

## 2020-11-13 DIAGNOSIS — S16.1XXA STRAIN OF MUSCLE, FASCIA AND TENDON AT NECK LEVEL, INITIAL ENCOUNTER: ICD-10-CM

## 2020-11-13 DIAGNOSIS — Z90.49 ACQUIRED ABSENCE OF OTHER SPECIFIED PARTS OF DIGESTIVE TRACT: Chronic | ICD-10-CM

## 2020-11-13 DIAGNOSIS — M54.9 DORSALGIA, UNSPECIFIED: ICD-10-CM

## 2020-11-13 DIAGNOSIS — Z79.84 LONG TERM (CURRENT) USE OF ORAL HYPOGLYCEMIC DRUGS: ICD-10-CM

## 2020-11-13 DIAGNOSIS — Z79.899 OTHER LONG TERM (CURRENT) DRUG THERAPY: ICD-10-CM

## 2020-11-13 DIAGNOSIS — M54.2 CERVICALGIA: ICD-10-CM
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
## 2020-11-13 PROCEDURE — 99283 EMERGENCY DEPT VISIT LOW MDM: CPT | Mod: 25

## 2020-11-13 PROCEDURE — 99283 EMERGENCY DEPT VISIT LOW MDM: CPT

## 2020-11-13 PROCEDURE — 71046 X-RAY EXAM CHEST 2 VIEWS: CPT

## 2020-11-13 PROCEDURE — 71046 X-RAY EXAM CHEST 2 VIEWS: CPT | Mod: 26

## 2020-11-13 RX ORDER — ACETAMINOPHEN 500 MG
650 TABLET ORAL ONCE
Refills: 0 | Status: COMPLETED | OUTPATIENT
Start: 2020-11-13 | End: 2020-11-13

## 2020-11-13 RX ORDER — IBUPROFEN 200 MG
400 TABLET ORAL ONCE
Refills: 0 | Status: DISCONTINUED | OUTPATIENT
Start: 2020-11-13 | End: 2020-11-14
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
## 2020-11-13 NOTE — ED ADULT TRIAGE NOTE - TEMPERATURE IN CELSIUS (DEGREES C)
36.7 Complex Repair And Double Advancement Flap Text: The defect edges were debeveled with a #15 scalpel blade.  The primary defect was closed partially with a complex linear closure.  Given the location of the remaining defect, shape of the defect and the proximity to free margins a double advancement flap was deemed most appropriate for complete closure of the defect.  Using a sterile surgical marker, an appropriate advancement flap was drawn incorporating the defect and placing the expected incisions within the relaxed skin tension lines where possible.    The area thus outlined was incised deep to adipose tissue with a #15 scalpel blade.  The skin margins were undermined to an appropriate distance in all directions utilizing iris scissors.

## 2020-11-14 RX ADMIN — Medication 650 MILLIGRAM(S): at 00:18

## 2020-11-14 RX ADMIN — Medication 650 MILLIGRAM(S): at 00:19
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
## 2020-11-14 NOTE — ED PROVIDER NOTE - PATIENT PORTAL LINK FT
You can access the FollowMyHealth Patient Portal offered by [facility] by registering at the following website: [email or web address]. By joining [email or web address]’s FollowMyHealth portal, you will also be able to view your health information using other applications (apps) compatible with our system.

## 2020-11-14 NOTE — ED PROVIDER NOTE - PHYSICAL EXAMINATION
GEN - NAD; well appearing; A+O x3   HEAD - NC/AT     EYES - EOMI, no conjunctival pallor, no scleral icterus  ENT -   mucous membranes  moist , no discharge      NECK - Neck supple  PULM - CTA b/l,  symmetric breath sounds  COR -  RRR, S1 S2, no murmurs , left chest wall ttp   ABD - , ND, NT, soft, no guarding, no rebound, no masses    BACK - no CVA tenderness, nontender spine , + paraspinal TTP     EXTREMS - no edema, no deformity, warm and well perfused    SKIN - no rash or bruising      NEUROLOGIC - alert, sensation nl, motor 5/5 RUE/LUE/RLE/LLE
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
## 2020-11-14 NOTE — ED PROVIDER NOTE - NSFOLLOWUPINSTRUCTIONS_ED_ALL_ED_FT
Please take motrin and tylenol for pain return to ED if any concerning symptoms.                              Subluxación cervical    Cervical Subluxation       La subluxación cervical es la separación de los huesos del otto (vértebras cervicales) que se produce debido a gary lesión en los ligamentos que mantienen unidas las vértebras. Las vértebras cervicales se componen de siete huesos. Estas vértebras sostienen la joo y protegen la médula ward en guzman paso por el otto.      ¿Cuáles son las causas?  La causa de esta afección puede ser gary lesión o un traumatismo que sucede con un impacto brusco. Suele producirse cuando el otto se hiperextiende. Puede deberse a lo siguiente:  •Un accidente automovilístico. Esta es la causa más frecuente de esta afección.      •Gary lesión deportiva.      •Gary caída.      •Un ataque físico violento.    Otras causas son:  •Tener artritis.      •Tener syl postura ruthie un kitty período (crónica).        ¿Cuáles son los signos o los síntomas?  Los síntomas varían en función del ángulo de separación de las vértebras cervicales. Algunos de los síntomas son los siguientes:  •Dolor o sensibilidad del otto.      •Dolor al  el otto.      •Polly de joo.      •Rigidez en el otto.      •Dificultad para  el otto.      •Espasmos musculares en el otto.      •Mareos.      •Un cambio en la posición del otto.    Cuando hay lesiones en la médula ward y las raíces nerviosas del otto, los síntomas también pueden incluir lo siguiente:  •Debilidad y adormecimiento del brazo o de la pierna.      •Debilidad en prensión de las harlan. Debido a esto, es posible que se le caigan las cosas al sujetarlas.      •Syl caligrafía.      •Caídas o dificultades para mantener el equilibrio.      •Pérdida del control de la vejiga o los intestinos (incontinencia).        ¿Cómo se diagnostica?  Esta afección se diagnostica mediante los antecedentes médicos, un examen físico y estudios de diagnóstico por imágenes. Las pruebas pueden incluir las siguientes:  •Radiografías.      •Gary exploración por tomografía computarizada (TC).      •Gary resonancia magnética (RM).        ¿Cómo se trata?  El tratamiento de esta afección depende de la gravedad. El tratamiento puede implicar lo siguiente:  •Hacer reposo.      •El uso de un collarín o un anillo para apoyar el otto. Norma limitará los movimientos del otto.      •Medicamentos para aliviar el dolor y reducir la hinchazón.      •Reducción cerrada. Es un método de manipulación física para realinear las vértebras cervicales sin tener que realizar gary cirugía.      •Tracción esquelética. Se utilizan pesos, poleas y cuerdas para ejercer gary fuerza de tracción y realinear las vértebras cervicales. Puede utilizarse antes de la cirugía.    •Cirugía para poner las vértebras cervicales nuevamente en guzman lugar. La cirugía es necesaria en los siguientes casos:  •Las vértebras cervicales no permanecen en guzman lugar y se mueven con facilidad (son inestables).      •Los nervios están muy dañados en el área de las vértebras cervicales.      •Hay gary lesión en la médula ward.      •Hay acumulación de shira sobre la médula ward.      •Un disco cervical comprime la médula ward.          Siga estas instrucciones en guzman casa:    Si tiene un aparato ortopédico para el otto o collarín:     •Use el aparato ortopédico para el otto o collarín sherri se lo haya indicado el médico.      • No se lo quite hasta que el médico lo autorice.      •Mantenga el aparato ortopédico para el otto o collarín seco y limpio.      •Pregúntele al médico cuándo puede volver a conducir si tiene un aparato ortopédico para el otto o collarín.      Actividad     •Kelly reposo y limite el movimiento del otto sherri se lo haya indicado el médico.      •Limite la cantidad de actividad física que realiza sherri se lo haya indicado el médico. Pregúntele al médico qué actividades son seguras para usted.      •Retome margret actividades normales según lo indicado por el médico.      • No levante ningún objeto que pese más de 10 libras (4.5 kg) o que supere el límite de peso que le hayan indicado, hasta que el médico le diga que puede hacerlo.      •Si le indicaron fisioterapia, kelly los ejercicios sherri se lo haya indicado el médico o el fisioterapeuta.      Instrucciones generales     •[place]deavor los medicamentos de venta annalisa y los recetados solamente sherri se lo haya indicado el médico.      •Pregúntele al médico si el medicamento recetado le impide conducir o usar maquinaria pesada.      • No tome brian de inmersión, no nade ni use el jacuzzi hasta que el médico lo autorice. Pregúntele al médico si puede ducharse. Luis vez solo le permitan darse brian de esponja.      •Concurra a todas las visitas de seguimiento sherri se lo haya indicado el médico. [place] es importante.        Comuníquese con un médico si:    •El dolor persiste y no mejora después de lesli analgésicos.      •Siente debilidad, entumecimiento u hormigueo en las piernas o los brazos y estos síntomas empeoran.      •Tiene fiebre.        Solicite ayuda inmediatamente si:    •Siente súbitamente dolor intenso en el otto.      •Siente debilidad intensa, entumecimiento u hormigueo de manera repentina en los brazos o en las piernas.      •Tiene dificultad para caminar o se  sin motivo.      •Tiene un dolor de joo intenso que no se debi.      •No puede controlar los intestinos o la vejiga.      •Presenta dificultad para respirar.        Resumen    •La subluxación cervical es la separación de los huesos del otto (vértebras cervicales) que se produce debido a gary lesión en los ligamentos que mantienen unidas las vértebras.      •El tratamiento de esta afección depende de la gravedad.      •El tratamiento puede consistir en hacer reposo, usar un aparato ortopédico para el otto o collarín, lesli analgésicos y realizar manipulación física para realinear las vértebras cervicales.      •Solicite ayuda de inmediato si súbitamente siente un dolor intenso en el otto o siente debilidad intensa, entumecimiento u hormigueo en los brazos o en las piernas.      •Concurra a todas las visitas de seguimiento sherri se lo haya indicado el médico. [place] es importante.      Esta información no tiene sherri fin reemplazar el consejo del médico. Asegúrese de hacerle al médico cualquier pregunta que tenga.      Document Revised: 2020 Document Reviewed: 2020    Elsevier Patient Education 2020 [employer]
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
## 2020-11-14 NOTE — ED PROVIDER NOTE - NS ED ROS FT
Gen: No fever, normal appetite  Eyes: No eye irritation or discharge  ENT: No ear pain, congestion, sore throat  Resp: No cough or trouble breathing  Cardiovascular: No chest pain or palpitation  Gastroenteric: No nausea/vomiting, diarrhea, constipation  :  No change in urine output; no dysuria  MS: back pain, neck pain   Skin: No rashes  Neuro: No headache; no abnormal movements  Remainder negative, except as per the HPI

## 2020-11-14 NOTE — ED ADULT NURSE NOTE - CHPI ED NUR SYMPTOMS NEG
no difficulty bearing weight/no dizziness/no headache/no loss of consciousness/no decreased eating/drinking/no laceration
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
## 2021-06-29 ENCOUNTER — OUTPATIENT (OUTPATIENT)
Dept: OUTPATIENT SERVICES | Facility: HOSPITAL | Age: 51
LOS: 1 days | End: 2021-06-29
Payer: COMMERCIAL

## 2021-06-29 ENCOUNTER — APPOINTMENT (OUTPATIENT)
Dept: ULTRASOUND IMAGING | Facility: CLINIC | Age: 51
End: 2021-06-29
Payer: COMMERCIAL

## 2021-06-29 ENCOUNTER — APPOINTMENT (OUTPATIENT)
Dept: MAMMOGRAPHY | Facility: CLINIC | Age: 51
End: 2021-06-29
Payer: COMMERCIAL

## 2021-06-29 DIAGNOSIS — Z12.31 ENCOUNTER FOR SCREENING MAMMOGRAM FOR MALIGNANT NEOPLASM OF BREAST: ICD-10-CM

## 2021-06-29 DIAGNOSIS — Z90.49 ACQUIRED ABSENCE OF OTHER SPECIFIED PARTS OF DIGESTIVE TRACT: Chronic | ICD-10-CM

## 2021-06-29 PROCEDURE — G0279: CPT | Mod: 26

## 2021-06-29 PROCEDURE — 76642 ULTRASOUND BREAST LIMITED: CPT | Mod: 26,50

## 2021-06-29 PROCEDURE — 76642 ULTRASOUND BREAST LIMITED: CPT

## 2021-06-29 PROCEDURE — G0279: CPT

## 2021-06-29 PROCEDURE — 77066 DX MAMMO INCL CAD BI: CPT

## 2021-06-29 PROCEDURE — 77066 DX MAMMO INCL CAD BI: CPT | Mod: 26

## 2022-05-21 ENCOUNTER — EMERGENCY (EMERGENCY)
Facility: HOSPITAL | Age: 52
LOS: 0 days | Discharge: HOME | End: 2022-05-21
Attending: EMERGENCY MEDICINE
Payer: MEDICAID

## 2022-05-21 VITALS
SYSTOLIC BLOOD PRESSURE: 115 MMHG | DIASTOLIC BLOOD PRESSURE: 76 MMHG | RESPIRATION RATE: 16 BRPM | HEART RATE: 64 BPM | TEMPERATURE: 98 F | OXYGEN SATURATION: 100 %

## 2022-05-21 VITALS — WEIGHT: 130.07 LBS | HEIGHT: 62 IN

## 2022-05-21 DIAGNOSIS — R07.89 OTHER CHEST PAIN: ICD-10-CM

## 2022-05-21 DIAGNOSIS — Z79.84 LONG TERM (CURRENT) USE OF ORAL HYPOGLYCEMIC DRUGS: ICD-10-CM

## 2022-05-21 DIAGNOSIS — Z90.49 ACQUIRED ABSENCE OF OTHER SPECIFIED PARTS OF DIGESTIVE TRACT: Chronic | ICD-10-CM

## 2022-05-21 DIAGNOSIS — E11.9 TYPE 2 DIABETES MELLITUS WITHOUT COMPLICATIONS: ICD-10-CM

## 2022-05-21 LAB
ALBUMIN SERPL ELPH-MCNC: 3.8 G/DL — SIGNIFICANT CHANGE UP (ref 3.3–5)
ALP SERPL-CCNC: 77 U/L — SIGNIFICANT CHANGE UP (ref 40–120)
ALT FLD-CCNC: 23 U/L — SIGNIFICANT CHANGE UP (ref 12–78)
ANION GAP SERPL CALC-SCNC: 6 MMOL/L — SIGNIFICANT CHANGE UP (ref 5–17)
AST SERPL-CCNC: 16 U/L — SIGNIFICANT CHANGE UP (ref 15–37)
BASOPHILS # BLD AUTO: 0.03 K/UL — SIGNIFICANT CHANGE UP (ref 0–0.2)
BASOPHILS NFR BLD AUTO: 0.3 % — SIGNIFICANT CHANGE UP (ref 0–2)
BILIRUB SERPL-MCNC: 0.1 MG/DL — LOW (ref 0.2–1.2)
BUN SERPL-MCNC: 15 MG/DL — SIGNIFICANT CHANGE UP (ref 7–23)
CALCIUM SERPL-MCNC: 9.3 MG/DL — SIGNIFICANT CHANGE UP (ref 8.5–10.1)
CHLORIDE SERPL-SCNC: 110 MMOL/L — HIGH (ref 96–108)
CO2 SERPL-SCNC: 28 MMOL/L — SIGNIFICANT CHANGE UP (ref 22–31)
CREAT SERPL-MCNC: 0.78 MG/DL — SIGNIFICANT CHANGE UP (ref 0.5–1.3)
EGFR: 91 ML/MIN/1.73M2 — SIGNIFICANT CHANGE UP
EOSINOPHIL # BLD AUTO: 0.18 K/UL — SIGNIFICANT CHANGE UP (ref 0–0.5)
EOSINOPHIL NFR BLD AUTO: 2.1 % — SIGNIFICANT CHANGE UP (ref 0–6)
GLUCOSE SERPL-MCNC: 113 MG/DL — HIGH (ref 70–99)
HCT VFR BLD CALC: 41.8 % — SIGNIFICANT CHANGE UP (ref 34.5–45)
HGB BLD-MCNC: 13.4 G/DL — SIGNIFICANT CHANGE UP (ref 11.5–15.5)
IMM GRANULOCYTES NFR BLD AUTO: 0.2 % — SIGNIFICANT CHANGE UP (ref 0–1.5)
LYMPHOCYTES # BLD AUTO: 3.08 K/UL — SIGNIFICANT CHANGE UP (ref 1–3.3)
LYMPHOCYTES # BLD AUTO: 35.3 % — SIGNIFICANT CHANGE UP (ref 13–44)
MAGNESIUM SERPL-MCNC: 2.1 MG/DL — SIGNIFICANT CHANGE UP (ref 1.6–2.6)
MCHC RBC-ENTMCNC: 30.6 PG — SIGNIFICANT CHANGE UP (ref 27–34)
MCHC RBC-ENTMCNC: 32.1 GM/DL — SIGNIFICANT CHANGE UP (ref 32–36)
MCV RBC AUTO: 95.4 FL — SIGNIFICANT CHANGE UP (ref 80–100)
MONOCYTES # BLD AUTO: 0.85 K/UL — SIGNIFICANT CHANGE UP (ref 0–0.9)
MONOCYTES NFR BLD AUTO: 9.7 % — SIGNIFICANT CHANGE UP (ref 2–14)
NEUTROPHILS # BLD AUTO: 4.56 K/UL — SIGNIFICANT CHANGE UP (ref 1.8–7.4)
NEUTROPHILS NFR BLD AUTO: 52.4 % — SIGNIFICANT CHANGE UP (ref 43–77)
NT-PROBNP SERPL-SCNC: 40 PG/ML — SIGNIFICANT CHANGE UP (ref 0–125)
PLATELET # BLD AUTO: 264 K/UL — SIGNIFICANT CHANGE UP (ref 150–400)
POTASSIUM SERPL-MCNC: 3.9 MMOL/L — SIGNIFICANT CHANGE UP (ref 3.5–5.3)
POTASSIUM SERPL-SCNC: 3.9 MMOL/L — SIGNIFICANT CHANGE UP (ref 3.5–5.3)
PROT SERPL-MCNC: 7.5 GM/DL — SIGNIFICANT CHANGE UP (ref 6–8.3)
RBC # BLD: 4.38 M/UL — SIGNIFICANT CHANGE UP (ref 3.8–5.2)
RBC # FLD: 12.7 % — SIGNIFICANT CHANGE UP (ref 10.3–14.5)
SODIUM SERPL-SCNC: 144 MMOL/L — SIGNIFICANT CHANGE UP (ref 135–145)
TROPONIN I, HIGH SENSITIVITY RESULT: 3.54 NG/L — SIGNIFICANT CHANGE UP
WBC # BLD: 8.72 K/UL — SIGNIFICANT CHANGE UP (ref 3.8–10.5)
WBC # FLD AUTO: 8.72 K/UL — SIGNIFICANT CHANGE UP (ref 3.8–10.5)

## 2022-05-21 PROCEDURE — 36415 COLL VENOUS BLD VENIPUNCTURE: CPT

## 2022-05-21 PROCEDURE — 85025 COMPLETE CBC W/AUTO DIFF WBC: CPT

## 2022-05-21 PROCEDURE — 71046 X-RAY EXAM CHEST 2 VIEWS: CPT

## 2022-05-21 PROCEDURE — 99284 EMERGENCY DEPT VISIT MOD MDM: CPT

## 2022-05-21 PROCEDURE — 93005 ELECTROCARDIOGRAM TRACING: CPT

## 2022-05-21 PROCEDURE — 83880 ASSAY OF NATRIURETIC PEPTIDE: CPT

## 2022-05-21 PROCEDURE — 99285 EMERGENCY DEPT VISIT HI MDM: CPT | Mod: 25

## 2022-05-21 PROCEDURE — 99285 EMERGENCY DEPT VISIT HI MDM: CPT

## 2022-05-21 PROCEDURE — 83735 ASSAY OF MAGNESIUM: CPT

## 2022-05-21 PROCEDURE — 93010 ELECTROCARDIOGRAM REPORT: CPT

## 2022-05-21 PROCEDURE — 80053 COMPREHEN METABOLIC PANEL: CPT

## 2022-05-21 PROCEDURE — 71046 X-RAY EXAM CHEST 2 VIEWS: CPT | Mod: 26

## 2022-05-21 PROCEDURE — 84484 ASSAY OF TROPONIN QUANT: CPT

## 2022-05-21 RX ORDER — ALPRAZOLAM 0.25 MG
1 TABLET ORAL
Qty: 10 | Refills: 0
Start: 2022-05-21

## 2022-05-21 RX ORDER — ALPRAZOLAM 0.25 MG
0.5 TABLET ORAL ONCE
Refills: 0 | Status: DISCONTINUED | OUTPATIENT
Start: 2022-05-21 | End: 2022-05-21

## 2022-05-21 RX ADMIN — Medication 0.5 MILLIGRAM(S): at 18:25

## 2022-05-21 NOTE — ED STATDOCS - NS ED ATTENDING STATEMENT MOD
I have seen and examined this patient and fully participated in the care of this patient as the teaching attending.  The service was shared with the VIC.  I reviewed and verified the documentation and independently performed the documented:

## 2022-05-21 NOTE — ED STATDOCS - NS ED ROS FT
Constitutional: No fever or chills  Eyes: No visual changes  HEENT: No throat pain  CV: +chest pain, +palpitations, +tremors  Resp: No SOB no cough  GI: No abd pain, nausea or vomiting  : No dysuria  MSK: No musculoskeletal pain  Skin: No rash  Neuro: No headache

## 2022-05-21 NOTE — ED STATDOCS - PATIENT PORTAL LINK FT
You can access the FollowMyHealth Patient Portal offered by [facility] by registering at the following website: [email or web address]. By joining [facility]’s FollowMyHealth portal, you will also be able to view your health information using other applications (apps) compatible with our system.

## 2022-05-21 NOTE — ED STATDOCS - CARE PROVIDER_API CALL
Elier Bautista (MD)  Cardiovascular Disease  [address], [address]  [place] [address]  Phone: [phone]  Fax: [phone]  Follow Up Time:

## 2022-05-21 NOTE — ED STATDOCS - OBJECTIVE STATEMENT
hx obtained by [facility] : #[id number]. 52 y/o female with PMHx of DM presents to the ED c/o chest pain x 1 week. Pt states that when she breathes not enough air goes into lungs. Pt also starts shaking and endorsed tremors 1hr PTA. CP described as stabbing. Pt also endorses palpitations. Pt reports the sudden loss of her 16 y/o son recently. Pt states that this is when sx started. Pt has had prior episodes of these sx in the past. Pt denies any recent cardiac testing.

## 2022-05-21 NOTE — ED STATDOCS - NSFOLLOWUPINSTRUCTIONS_ED_ALL_ED_FT
SIGUE A TU MÉDICO EN 1-2 DÍAS. REGRESE A LA ER PARA CUALQUIER SÍNTOMA PENDIENTE O NUEVAS PREOCUPACIONES.     Dolor de pecho inespecífico  Nonspecific Chest Pain  El dolor de pecho puede deberse a muchas afecciones diferentes. Algunas causas del dolor de pecho pueden ser potencialmente mortales. Estas requieren tratamiento inmediato. Algunas causas grave de dolor en el pecho son las siguientes:  Infarto de miocardio.Un desgarro en el vaso sanguíneo principal del cuerpo.Enrojecimiento e hinchazón (inflamación) alrededor del corazón.Un coágulo de shira en los pulmones.Otras causas de dolor en el pecho pueden no ser tan graves. Estos incluyen los siguientes:  Acidez estomacal.Ansiedad o estrés.Daño en los huesos o músculos del corazón.Infecciones pulmonares.El dolor de pecho puede provocar las siguientes sensaciones:  Dolor o molestias en el pecho.Dolor opresivo, continuo o constrictivo. Ardor u hormigueo. Dolor sordo o intenso que empeora al moverse, toser o inhalar profundamente. Dolor o molestias que también se sienten en la espalda, el otto, la mandíbula, el hombro o el brazo, o dolor que se extiende a cualquiera de estas zonas.Es difícil saber si la causa del dolor es algo grave o algo que no es tan grave. Por lo tanto, es importante que consulte al médico inmediatamente si tiene dolor en el pecho.  Siga estas indicaciones en guzman casa:  Medicamentos     [place] los medicamentos de venta annalisa y los recetados solamente sherri se lo haya indicado el médico.Si le recetaron un antibiótico, tómelo sherri se lo haya indicado el médico. No deje de lesli los antibióticos aunque comience a sentirse mejor.Estilo de junior        Kelly reposo sherri se lo haya indicado el médico.No consuma ningún producto que contenga nicotina o tabaco, sherri cigarrillos, cigarrillos electrónicos y tabaco de mascar. Si necesita ayuda para dejar de fumar, consulte al médico.No sofia alcohol.Kelly cambios en guzman estilo de junior según las indicaciones del médico. Estos pueden incluir lo siguiente:  Practicar actividad física con regularidad. Pregúntele al médico qué actividades son seguras para usted.Seguir gary dieta cardiosaludable. Un especialista en dietas y alimentación (nutricionista) puede ayudarlo a que kelly elecciones saludables.Mantener un peso saludable.Tratar la diabetes o la presión arterial lev, si es necesario.Reducir el estrés. Las actividades sherri el yoga y las técnicas de relajación pueden ayudar.Indicaciones generales     Esté atento a cualquier cambio en los síntomas. Informe a guzman médico si presenta algún cambio en los síntomas o si aparecen síntomas nuevos.Evite las actividades que le causen dolor de pecho.Concurra a todas las visitas de seguimiento sherri se lo haya indicado el médico. [place] es importante. Es posible que tenga que someterse a más estudios si el dolor de pecho no desaparece.Comuníquese con un médico si:  El dolor de pecho no desaparece.Se siente deprimido.Tiene fiebre.Solicite ayuda inmediatamente si:  El dolor en el pecho es más intenso.Tiene tos que empeora o tose con shira.Tiene dolor muy intenso en el vientre (abdomen).Pierde el conocimiento (se desmaya).Tiene cualquiera de estos síntomas sin ninguna causa jeffry:  Malestar repentino en el pecho.Molestias repentinas en los brazos, la espalda, el otto o la mandíbula.Le falta el aire en cualquier momento.Comienza a sudar de manera repentina o la piel se le humedece.Siente malestar estomacal (náuseas).Vomita.Se siente repentinamente mareado o se desmaya.Se siente muy débil o cansado.El corazón comienza a latirle rápidamente o parece que se saltea latidos.Estos síntomas pueden indicar gary emergencia. No espere a stephania si los síntomas desaparecen. Solicite atención médica de inmediato. Comuníquese con el servicio de emergencias de guzman localidad (911 en los [place]). No conduzca por margret propios medios hasta el hospital.   Resumen  El dolor de pecho puede deberse a muchas afecciones diferentes. La causa puede ser grave y requerir tratamiento de inmediato. Si tiene dolor de pecho, consulte al médico de inmediato.Siga las indicaciones del médico para lesli los medicamentos y hacer cambios en guzman estilo de junior. Concurra a todas las visitas de seguimiento sherri se lo haya indicado el médico. [place] incluye las visitas para realizarle otros estudios si el dolor de pecho no desaparece.Asegúrese de conocer los signos que indican que guzman afección ha empeorado. Obtenga ayuda de inmediato si tiene esos síntomas.Esta información no tiene sherri fin reemplazar el consejo del médico. Asegúrese de hacerle al médico cualquier pregunta que tenga.

## 2022-05-21 NOTE — ED STATDOCS - PHYSICAL EXAMINATION
Constitutional: NAD AOx3  Eyes: PERRL EOMI  Head: Normocephalic atraumatic  Mouth: MMM  Cardiac: regular rate and rhythm  Resp: Lungs CTAB  GI: Abd s/nd/nt  Neuro: CN2-12 grossly intact, SWAIN x 4  Skin: No visible rashes Constitutional: tearful, obese, NAD AOx3  Eyes: PERRL EOMI  Head: Normocephalic atraumatic  Mouth: MMM  Cardiac: regular rate and rhythm  Resp: Lungs CTAB  GI: Abd s/nd/nt  Neuro: CN2-12 grossly intact, SWAIN x 4  Skin: No visible rashes

## 2022-05-21 NOTE — ED STATDOCS - NSPTACCESSSVCSAPPTDETAILS_ED_ALL_ED_FT
chest pain, f/u this week. pt with recent severe stress, found her son  at home  [phone]  daughter Marium [phone]

## 2022-05-21 NOTE — ED STATDOCS - PROGRESS NOTE DETAILS
signed Yin Kay PA-C Pt seen initially in intake by Dr Guerra. Pt declines  services, prefers to use adult friend at bedside.   51F c/o chest pain x 1 week. Pt has had a lot of stress recently, her mother passed away in December, she found her teenage son  at home recently, and her teenage daughter is pregnant. Pt is a single mother. No significant findings on labs, imaging, or EKG. Symptoms likely due to stress but recommend pt f/u with cardiology and PMD this week. will give rx for limited xanax, pt getting ride home from friend. return precautions given. Pt feeling well at DC, agrees with DC and plan of care.

## 2022-05-21 NOTE — ED ADULT NURSE NOTE - OBJECTIVE STATEMENT
Pt presents to ED c/o chest pain x 1 week. Pt describes CP as constant, sharp and burning. Pt states that when she breathes in she doesn't feel like she can get a full breath. Pt reports she found her 18 y/o son dead on Mother's Day. Pt states that this is when sx started.

## 2022-06-21 ENCOUNTER — INPATIENT (INPATIENT)
Facility: HOSPITAL | Age: 52
LOS: 1 days | Discharge: HOME | DRG: 47 | End: 2022-06-23
Attending: STUDENT IN AN ORGANIZED HEALTH CARE EDUCATION/TRAINING PROGRAM | Admitting: INTERNAL MEDICINE
Payer: MEDICAID

## 2022-06-21 VITALS — WEIGHT: 209.66 LBS

## 2022-06-21 DIAGNOSIS — Z90.49 ACQUIRED ABSENCE OF OTHER SPECIFIED PARTS OF DIGESTIVE TRACT: Chronic | ICD-10-CM

## 2022-06-21 DIAGNOSIS — G45.9 TRANSIENT CEREBRAL ISCHEMIC ATTACK, UNSPECIFIED: ICD-10-CM

## 2022-06-21 LAB
ALBUMIN SERPL ELPH-MCNC: 3.3 G/DL — SIGNIFICANT CHANGE UP (ref 3.3–5)
ALP SERPL-CCNC: 91 U/L — SIGNIFICANT CHANGE UP (ref 40–120)
ALT FLD-CCNC: 34 U/L — SIGNIFICANT CHANGE UP (ref 12–78)
ANION GAP SERPL CALC-SCNC: 6 MMOL/L — SIGNIFICANT CHANGE UP (ref 5–17)
APTT BLD: 28.3 SEC — SIGNIFICANT CHANGE UP (ref 27.5–35.5)
AST SERPL-CCNC: 14 U/L — LOW (ref 15–37)
BASOPHILS # BLD AUTO: 0.02 K/UL — SIGNIFICANT CHANGE UP (ref 0–0.2)
BASOPHILS NFR BLD AUTO: 0.2 % — SIGNIFICANT CHANGE UP (ref 0–2)
BILIRUB SERPL-MCNC: 0.3 MG/DL — SIGNIFICANT CHANGE UP (ref 0.2–1.2)
BUN SERPL-MCNC: 22 MG/DL — SIGNIFICANT CHANGE UP (ref 7–23)
CALCIUM SERPL-MCNC: 8.9 MG/DL — SIGNIFICANT CHANGE UP (ref 8.5–10.1)
CHLORIDE SERPL-SCNC: 107 MMOL/L — SIGNIFICANT CHANGE UP (ref 96–108)
CO2 SERPL-SCNC: 28 MMOL/L — SIGNIFICANT CHANGE UP (ref 22–31)
CREAT SERPL-MCNC: 0.77 MG/DL — SIGNIFICANT CHANGE UP (ref 0.5–1.3)
EGFR: 93 ML/MIN/1.73M2 — SIGNIFICANT CHANGE UP
EOSINOPHIL # BLD AUTO: 0.12 K/UL — SIGNIFICANT CHANGE UP (ref 0–0.5)
EOSINOPHIL NFR BLD AUTO: 1.2 % — SIGNIFICANT CHANGE UP (ref 0–6)
FLUAV AG NPH QL: SIGNIFICANT CHANGE UP
FLUBV AG NPH QL: SIGNIFICANT CHANGE UP
GLUCOSE SERPL-MCNC: 112 MG/DL — HIGH (ref 70–99)
HCT VFR BLD CALC: 38.6 % — SIGNIFICANT CHANGE UP (ref 34.5–45)
HGB BLD-MCNC: 12.5 G/DL — SIGNIFICANT CHANGE UP (ref 11.5–15.5)
HIV 1+2 AB+HIV1 P24 AG SERPL QL IA: SIGNIFICANT CHANGE UP
IMM GRANULOCYTES NFR BLD AUTO: 0.7 % — SIGNIFICANT CHANGE UP (ref 0–1.5)
INR BLD: 0.88 RATIO — SIGNIFICANT CHANGE UP (ref 0.88–1.16)
LYMPHOCYTES # BLD AUTO: 2.06 K/UL — SIGNIFICANT CHANGE UP (ref 1–3.3)
LYMPHOCYTES # BLD AUTO: 20.7 % — SIGNIFICANT CHANGE UP (ref 13–44)
MCHC RBC-ENTMCNC: 31.1 PG — SIGNIFICANT CHANGE UP (ref 27–34)
MCHC RBC-ENTMCNC: 32.4 GM/DL — SIGNIFICANT CHANGE UP (ref 32–36)
MCV RBC AUTO: 96 FL — SIGNIFICANT CHANGE UP (ref 80–100)
MONOCYTES # BLD AUTO: 1.12 K/UL — HIGH (ref 0–0.9)
MONOCYTES NFR BLD AUTO: 11.2 % — SIGNIFICANT CHANGE UP (ref 2–14)
NEUTROPHILS # BLD AUTO: 6.57 K/UL — SIGNIFICANT CHANGE UP (ref 1.8–7.4)
NEUTROPHILS NFR BLD AUTO: 66 % — SIGNIFICANT CHANGE UP (ref 43–77)
PLATELET # BLD AUTO: 237 K/UL — SIGNIFICANT CHANGE UP (ref 150–400)
POTASSIUM SERPL-MCNC: 3.7 MMOL/L — SIGNIFICANT CHANGE UP (ref 3.5–5.3)
POTASSIUM SERPL-SCNC: 3.7 MMOL/L — SIGNIFICANT CHANGE UP (ref 3.5–5.3)
PROT SERPL-MCNC: 6.9 GM/DL — SIGNIFICANT CHANGE UP (ref 6–8.3)
PROTHROM AB SERPL-ACNC: 10.2 SEC — LOW (ref 10.5–13.4)
RBC # BLD: 4.02 M/UL — SIGNIFICANT CHANGE UP (ref 3.8–5.2)
RBC # FLD: 13.4 % — SIGNIFICANT CHANGE UP (ref 10.3–14.5)
RSV RNA NPH QL NAA+NON-PROBE: SIGNIFICANT CHANGE UP
SARS-COV-2 RNA SPEC QL NAA+PROBE: SIGNIFICANT CHANGE UP
SODIUM SERPL-SCNC: 141 MMOL/L — SIGNIFICANT CHANGE UP (ref 135–145)
TROPONIN I, HIGH SENSITIVITY RESULT: 3.97 NG/L — SIGNIFICANT CHANGE UP
WBC # BLD: 9.96 K/UL — SIGNIFICANT CHANGE UP (ref 3.8–10.5)
WBC # FLD AUTO: 9.96 K/UL — SIGNIFICANT CHANGE UP (ref 3.8–10.5)

## 2022-06-21 PROCEDURE — 70551 MRI BRAIN STEM W/O DYE: CPT | Mod: 26

## 2022-06-21 PROCEDURE — 97162 PT EVAL MOD COMPLEX 30 MIN: CPT | Mod: GP

## 2022-06-21 PROCEDURE — 85027 COMPLETE CBC AUTOMATED: CPT

## 2022-06-21 PROCEDURE — 92523 SPEECH SOUND LANG COMPREHEN: CPT | Mod: GN

## 2022-06-21 PROCEDURE — 83036 HEMOGLOBIN GLYCOSYLATED A1C: CPT

## 2022-06-21 PROCEDURE — 82746 ASSAY OF FOLIC ACID SERUM: CPT

## 2022-06-21 PROCEDURE — 82962 GLUCOSE BLOOD TEST: CPT

## 2022-06-21 PROCEDURE — 93306 TTE W/DOPPLER COMPLETE: CPT

## 2022-06-21 PROCEDURE — 70551 MRI BRAIN STEM W/O DYE: CPT | Mod: ME

## 2022-06-21 PROCEDURE — G1004: CPT

## 2022-06-21 PROCEDURE — 95816 EEG AWAKE AND DROWSY: CPT

## 2022-06-21 PROCEDURE — 80048 BASIC METABOLIC PNL TOTAL CA: CPT

## 2022-06-21 PROCEDURE — 99285 EMERGENCY DEPT VISIT HI MDM: CPT

## 2022-06-21 PROCEDURE — 97530 THERAPEUTIC ACTIVITIES: CPT | Mod: GP

## 2022-06-21 PROCEDURE — 70450 CT HEAD/BRAIN W/O DYE: CPT | Mod: 26,MA

## 2022-06-21 PROCEDURE — 84443 ASSAY THYROID STIM HORMONE: CPT

## 2022-06-21 PROCEDURE — 82607 VITAMIN B-12: CPT

## 2022-06-21 PROCEDURE — 85730 THROMBOPLASTIN TIME PARTIAL: CPT

## 2022-06-21 PROCEDURE — 36415 COLL VENOUS BLD VENIPUNCTURE: CPT

## 2022-06-21 PROCEDURE — 80061 LIPID PANEL: CPT

## 2022-06-21 PROCEDURE — 92610 EVALUATE SWALLOWING FUNCTION: CPT | Mod: GN

## 2022-06-21 PROCEDURE — 93010 ELECTROCARDIOGRAM REPORT: CPT

## 2022-06-21 PROCEDURE — 70547 MR ANGIOGRAPHY NECK W/O DYE: CPT | Mod: 26

## 2022-06-21 PROCEDURE — 71045 X-RAY EXAM CHEST 1 VIEW: CPT | Mod: 26

## 2022-06-21 PROCEDURE — 70544 MR ANGIOGRAPHY HEAD W/O DYE: CPT | Mod: ME

## 2022-06-21 PROCEDURE — 70544 MR ANGIOGRAPHY HEAD W/O DYE: CPT | Mod: 26

## 2022-06-21 PROCEDURE — 70547 MR ANGIOGRAPHY NECK W/O DYE: CPT | Mod: ME

## 2022-06-21 PROCEDURE — 97116 GAIT TRAINING THERAPY: CPT | Mod: GP

## 2022-06-21 PROCEDURE — 99223 1ST HOSP IP/OBS HIGH 75: CPT

## 2022-06-21 PROCEDURE — 71045 X-RAY EXAM CHEST 1 VIEW: CPT

## 2022-06-21 RX ORDER — SODIUM CHLORIDE 9 MG/ML
1000 INJECTION, SOLUTION INTRAVENOUS
Refills: 0 | Status: DISCONTINUED | OUTPATIENT
Start: 2022-06-21 | End: 2022-06-23

## 2022-06-21 RX ORDER — ACETAMINOPHEN 500 MG
2 TABLET ORAL
Qty: 0 | Refills: 0 | DISCHARGE

## 2022-06-21 RX ORDER — ATORVASTATIN CALCIUM 80 MG/1
80 TABLET, FILM COATED ORAL AT BEDTIME
Refills: 0 | Status: DISCONTINUED | OUTPATIENT
Start: 2022-06-21 | End: 2022-06-23

## 2022-06-21 RX ORDER — ACETAMINOPHEN 500 MG
650 TABLET ORAL EVERY 6 HOURS
Refills: 0 | Status: DISCONTINUED | OUTPATIENT
Start: 2022-06-21 | End: 2022-06-23

## 2022-06-21 RX ORDER — INSULIN LISPRO 100/ML
VIAL (ML) SUBCUTANEOUS
Refills: 0 | Status: DISCONTINUED | OUTPATIENT
Start: 2022-06-21 | End: 2022-06-23

## 2022-06-21 RX ORDER — METFORMIN HYDROCHLORIDE 850 MG/1
1 TABLET ORAL
Qty: 0 | Refills: 0 | DISCHARGE

## 2022-06-21 RX ORDER — GLUCAGON INJECTION, SOLUTION 0.5 MG/.1ML
1 INJECTION, SOLUTION SUBCUTANEOUS ONCE
Refills: 0 | Status: DISCONTINUED | OUTPATIENT
Start: 2022-06-21 | End: 2022-06-23

## 2022-06-21 RX ORDER — DEXTROSE 50 % IN WATER 50 %
25 SYRINGE (ML) INTRAVENOUS ONCE
Refills: 0 | Status: DISCONTINUED | OUTPATIENT
Start: 2022-06-21 | End: 2022-06-23

## 2022-06-21 RX ORDER — INFLUENZA VIRUS VACCINE 15; 15; 15; 15 UG/.5ML; UG/.5ML; UG/.5ML; UG/.5ML
0.5 SUSPENSION INTRAMUSCULAR ONCE
Refills: 0 | Status: DISCONTINUED | OUTPATIENT
Start: 2022-06-21 | End: 2022-06-23

## 2022-06-21 RX ORDER — ALPRAZOLAM 0.25 MG
0.25 TABLET ORAL ONCE
Refills: 0 | Status: DISCONTINUED | OUTPATIENT
Start: 2022-06-21 | End: 2022-06-21

## 2022-06-21 RX ORDER — DEXTROSE 50 % IN WATER 50 %
12.5 SYRINGE (ML) INTRAVENOUS ONCE
Refills: 0 | Status: DISCONTINUED | OUTPATIENT
Start: 2022-06-21 | End: 2022-06-23

## 2022-06-21 RX ORDER — SODIUM CHLORIDE 9 MG/ML
3 INJECTION INTRAMUSCULAR; INTRAVENOUS; SUBCUTANEOUS ONCE
Refills: 0 | Status: COMPLETED | OUTPATIENT
Start: 2022-06-21 | End: 2022-06-21

## 2022-06-21 RX ORDER — KETOROLAC TROMETHAMINE 30 MG/ML
15 SYRINGE (ML) INJECTION ONCE
Refills: 0 | Status: DISCONTINUED | OUTPATIENT
Start: 2022-06-21 | End: 2022-06-21

## 2022-06-21 RX ORDER — ASPIRIN/CALCIUM CARB/MAGNESIUM 324 MG
81 TABLET ORAL DAILY
Refills: 0 | Status: DISCONTINUED | OUTPATIENT
Start: 2022-06-21 | End: 2022-06-23

## 2022-06-21 RX ORDER — METOCLOPRAMIDE HCL 10 MG
5 TABLET ORAL ONCE
Refills: 0 | Status: COMPLETED | OUTPATIENT
Start: 2022-06-21 | End: 2022-06-21

## 2022-06-21 RX ORDER — ONDANSETRON 8 MG/1
4 TABLET, FILM COATED ORAL EVERY 6 HOURS
Refills: 0 | Status: DISCONTINUED | OUTPATIENT
Start: 2022-06-21 | End: 2022-06-23

## 2022-06-21 RX ORDER — DEXTROSE 50 % IN WATER 50 %
15 SYRINGE (ML) INTRAVENOUS ONCE
Refills: 0 | Status: DISCONTINUED | OUTPATIENT
Start: 2022-06-21 | End: 2022-06-23

## 2022-06-21 RX ORDER — ASPIRIN/CALCIUM CARB/MAGNESIUM 324 MG
324 TABLET ORAL ONCE
Refills: 0 | Status: COMPLETED | OUTPATIENT
Start: 2022-06-21 | End: 2022-06-21

## 2022-06-21 RX ORDER — CHLORHEXIDINE GLUCONATE 213 G/1000ML
1 SOLUTION TOPICAL DAILY
Refills: 0 | Status: DISCONTINUED | OUTPATIENT
Start: 2022-06-21 | End: 2022-06-23

## 2022-06-21 RX ADMIN — Medication 5 MILLIGRAM(S): at 22:03

## 2022-06-21 RX ADMIN — ATORVASTATIN CALCIUM 80 MILLIGRAM(S): 80 TABLET, FILM COATED ORAL at 21:53

## 2022-06-21 RX ADMIN — Medication 0.25 MILLIGRAM(S): at 20:27

## 2022-06-21 RX ADMIN — Medication 324 MILLIGRAM(S): at 14:51

## 2022-06-21 RX ADMIN — Medication 15 MILLIGRAM(S): at 21:53

## 2022-06-21 RX ADMIN — ONDANSETRON 4 MILLIGRAM(S): 8 TABLET, FILM COATED ORAL at 14:52

## 2022-06-21 RX ADMIN — Medication 650 MILLIGRAM(S): at 14:51

## 2022-06-21 NOTE — PATIENT PROFILE ADULT - FALL HARM RISK - UNIVERSAL INTERVENTIONS
Bed in lowest position, wheels locked, appropriate side rails in place/Call bell, personal items and telephone in reach/Instruct patient to call for assistance before getting out of bed or chair/Non-slip footwear when patient is out of bed/[place] to call system/Physically safe environment - no spills, clutter or unnecessary equipment/Purposeful Proactive Rounding/Room/bathroom lighting operational, light cord in reach

## 2022-06-21 NOTE — STROKE CODE NOTE - NSMDCONSULT QTN_Y FT
Patient is a 51y old  Female who presents with a chief complaint of HA, numbness, dizziness    Time patient arrived to ED: 1:39    HPI: 51 yr old F with PMHx significant for pre-diabetes, presented to  ED on 6/21 with c/o HA, numbness on L side of body, and dizziness, LKN was about 1 hr prior to arrival. Patient states that in May her son passed away, and ever since then she's been experiencing HA, palpitations and SOB, which is worse in the evenings. Today, she was at work, when she suddenly experienced an exploding HA with nausea, dizziness, she felt like her legs were going to give way, and the left side of her body felt numb. Due to worsening symptoms, she came to ED immediately.    In ED, code stroke was called. CTH showed no acute infarct or hemorrhage. IV TPA was not given because symptoms were minor and non disabling, NIHSS 1. Patient was given  mg.    Upon evaluation, patient is awake and alert. Admits to continued HA, and states her left face, arm and leg feel numb compared to her right side.  She denies any visual changes or changes in speech. She has no prior h/o CVA. She recently had an echo done as outpatient [facility] because of her palpitations, does not know the results yet.    PAST MEDICAL & SURGICAL HISTORY:  DM (diabetes mellitus)  S/P cholecystectomy    FAMILY HISTORY: Noncontributory     Social Hx:  Nonsmoker, no drug or alcohol use    Allergies: No Known Allergies    MEDICATIONS  (STANDING):  aspirin  chewable 324 milliGRAM(s) Oral once  chlorhexidine 4% Liquid 1 Application(s) Topical daily  sodium chloride 0.9% lock flush 3 milliLiter(s) IV Push once     acetaminophen     Tablet .. 650 milliGRAM(s) Oral every 6 hours PRN  aspirin  chewable 324 milliGRAM(s) Oral once  chlorhexidine 4% Liquid 1 Application(s) Topical daily  ondansetron Injectable 4 milliGRAM(s) IV Push every 6 hours PRN  sodium chloride 0.9% lock flush 3 milliLiter(s) IV Push once    Home Medications:  acetaminophen 325 mg oral tablet: 2 tab(s) orally every 6 hours, As needed, Mild Pain (1 - 3) (16 Sep 2018 11:39)  metFORMIN 500 mg oral tablet: 1 tab(s) orally 2 times a day (12 Sep 2018 08:24)    ROS: Pertinent positives in HPI, all other ROS were reviewed and are negative.      Vital Signs Last 24 Hrs  T(C): --  T(F): --  HR: --  BP: --  BP(mean): --  RR: --  SpO2: --    Physical Exam:  Gen: NAD, normocephalic  HEENT: PERRLA, EOMI  Neck: Supple  Respiratory: Breath sounds are clear bilaterally  Cardiovascular: S1 and S2  Extremities:  no edema  Vascular: No carotid Bruit  Musculoskeletal: no joint swelling/tenderness, no abnormal movements  Skin: No rashes    Neurological Exam:  HF: A x O x 3, appropriately interactive, normal affect, speech fluent, no aphasia or paraphasic errors. Naming /repetition intact   CN: PERRL, EOMI, VFF, facial sensation decreased on left side, no NLFD, tongue midline  Motor: No pronator drift, Strength 5/5 in all 4 ext, normal bulk and tone, no tremor, rigidity or bradykinesia  Sens: Decreased sensation on left UE and left LE  Reflexes: Symmetric and normal, downgoing toes b/l  Coord:  No FNFA, dysmetria, YANDY intact   Gait/Balance: Cannot test    NIHSS- 1    Radiology:  CT Brain Stroke Protocol (06.21.22 @ 13:51) >    IMPRESSION: No acute intracranial hemorrhage or mass effect.    A/P: 51 yr old F with PMHx significant for pre-diabetes, presented to  ED on 6/21 with c/o HA, numbness on L side of body, and dizziness, LKN was about 1 hr prior to arrival. Patient states that in May her son passed away, and ever since then she's been experiencing HA, palpitations and SOB, which is worse in the evenings. Today, she was at work, when she suddenly experienced an exploding HA with nausea, dizziness, she felt like her legs were going to give way, and the left side of her body felt numb. Due to worsening symptoms, she came to ED immediately.    In ED, code stroke was called. CTH showed no acute infarct or hemorrhage. IV TPA was not given because symptoms were minor and non disabling, NIHSS 1. Patient was given  mg.    #R/o acute R CVA, vs TIA- NIHSS 1    #Possible complex migraine etiology    - MRI head ordered  - MRA head and neck ordered  - ASA 81 mg QD  - Atorvastatin, lipid profile, Hgb A1c within 24 hours  - Neuro checks Q4h  - Vital signs Q4h  - Blood glucose checks Q6h for 1st 24hrs  - Echo  - PT eval  - Dysphagia screen today  - DVT prophylaxis  - Telemonitoring    Patient was seen and discussed with Dr. Overton

## 2022-06-21 NOTE — ED PROVIDER NOTE - OBJECTIVE STATEMENT
50 y/o  with a PMHx of DM, s/p cholecystectomy presents to the ED with headache and L sided numbness in the setting of being stressed after the passing of her son. 1 hr PTA, pt developed headache localized to the entire head, dizziness, numbness L side of head. Pt came to the ED, code stroke called. Non-smoker, non travel, no sick contacts. Pt reports the numbness is better but the headache still persists.   ID #[id number]

## 2022-06-21 NOTE — H&P ADULT - RESPIRATORY
normal/clear to auscultation bilaterally/no wheezes/no rales/no rhonchi
impaired postural control/decreased strength

## 2022-06-21 NOTE — H&P ADULT - HISTORY OF PRESENT ILLNESS
51 year old female patient with a pertinent past medical history noted for prediabetes presented to the ED after experienced a sudden onset of a pounding head that was associated with dizziness, unsteady gait, weakness, chest pain and palpitations while at work. Of note, patient endorses losing her son on May 8th 2022 and has been having intermittent chest pain and palpitations since then. She saw a Cardiologist one day prior to ED presentation and was given reassuring results. States she felt very unsteady on her feet but denies any slurred speech, facial drooping,, extremity weakness, loss of  strength or visual changes. States she felt as though she would fall when symptoms initially presented.        In the ED patient found to have a NIHSS of 1. A CT head non-contrast was negative for acute pathology.

## 2022-06-21 NOTE — ED ADULT TRIAGE NOTE - CHIEF COMPLAINT QUOTE
pt presents to ED due to complaints of HA dizziness with nausea and some numbness on right side denies blurry vision eval by JOSE Cristobal CODE STROKE called [id number]

## 2022-06-21 NOTE — ED PROVIDER NOTE - PROGRESS NOTE DETAILS
Attending Dr. Tian Ortiz, neurology eval.  Suggests admission for TIA work up    Pt with mild stroke severity and non-disabling stroke symptoms and not TPA candidate. Attending Ortiz, radiology called and NAD of ct head [place] JUARES for Dr. Ortiz: Discussed with Dr. Pal for admission.

## 2022-06-21 NOTE — H&P ADULT - NSHPPHYSICALEXAM_GEN_ALL_CORE
Vital Signs Last 24 Hrs  T(C): 36.7 (21 Jun 2022 15:12), Max: 36.7 (21 Jun 2022 15:12)  T(F): 98 (21 Jun 2022 15:12), Max: 98 (21 Jun 2022 15:12)  HR: 72 (21 Jun 2022 15:12) (70 - 77)  BP: 109/53 (21 Jun 2022 15:12) (109/53 - 135/86)  BP(mean): 92 (21 Jun 2022 14:47) (92 - 92)  RR: 18 (21 Jun 2022 15:12) (17 - 18)  SpO2: 99% (21 Jun 2022 15:12) (99% - 99%)

## 2022-06-21 NOTE — ED PROVIDER NOTE - CLINICAL SUMMARY MEDICAL DECISION MAKING FREE TEXT BOX
50 y/o female presents with dizziness, headache, L sided numbness. Plan: check labs, CT scan. 52 y/o female presents with dizziness, headache, L sided numbness. Plan: check labs, CT scan.  NIH = 1

## 2022-06-21 NOTE — H&P ADULT - NSICDXFAMILYHX_GEN_ALL_CORE_FT
FAMILY HISTORY:  Mother  Still living? Unknown  FHx: stroke, Age at diagnosis: Age Unknown    Grandparent  Still living? Unknown  FH: CAD (coronary artery disease), Age at diagnosis: Age Unknown

## 2022-06-21 NOTE — ED PROVIDER NOTE - MUSCULOSKELETAL, MLM
Problem: Self Harm/Suicidality  Goal: Will have no self-injury during hospital stay  Description: INTERVENTIONS:  1. Q 30 MINUTES: Routine safety checks  2. Q SHIFT & PRN: Assess risk to determine if routine checks are adequate to maintain patient safety  5/17/2022 1746 by Andrew El  Outcome: Progressing  5/17/2022 0753 by DIANNE Wagoner  Outcome: Progressing Spine appears normal, range of motion is not limited, no muscle or joint tenderness

## 2022-06-21 NOTE — ED ADULT NURSE NOTE - CHIEF COMPLAINT QUOTE
pt presents to ED due to complaints of HA dizziness with nausea and some numbness on right side denies blurry vision eval by JOSE Cristobal CODE STROKE called [id number]

## 2022-06-21 NOTE — ED PROVIDER NOTE - NEUROLOGICAL, MLM
Alert and oriented, no focal deficits, no motor or sensory deficits. CN 2-12 intact, 5/5 strength throughout, rapid alternating movements intact, finer to nose intact, subjective numbness to L face, negative Romberg. NIHSS = 1. Alert and oriented, no focal deficits, no motor, no facial droop, EOM intact, 5/5 strength throughout, rapid alternating movements intact, finer to nose intact, subjective numbness to L face, negative Romberg. NIHSS = 1.

## 2022-06-21 NOTE — ED ADULT NURSE NOTE - OBJECTIVE STATEMENT
c/o dizziness, vomiting, shakiness, pt. seen at CT, ambulatory from wheelchair to CT table, . North Korean speaking:    c/o dizziness, headache, vomiting, shakiness, L arm numbness, L knee weakness, L sided chest discomfort. pt. states she was very stressed due to sudden loss of her teenager son last May.  Code stroke called in pivot. cancelled by Neurology in [place],

## 2022-06-21 NOTE — H&P ADULT - ASSESSMENT
51 year old female patient with concern for CVA          -Admit to Tele        # CVA  -monitor on tele  -currently neurologically intact  -CT head non-contrast negative for acute pathology  -get MRI/A head and neck  -get morning echo  -PT evaluation  -speech evaluation  -bgm q6 x 24 hours  -Neurology following    # Near syncope  - check orthostatics  -monitor on tele  -get morning echo  -patient saw a cardiologist within the past 24 hours    # Prediabetes  -hold metformin  -ISS    # DVT ppx  -encourage ambulation

## 2022-06-22 LAB
ANION GAP SERPL CALC-SCNC: 4 MMOL/L — LOW (ref 5–17)
APTT BLD: 27 SEC — LOW (ref 27.5–35.5)
BUN SERPL-MCNC: 25 MG/DL — HIGH (ref 7–23)
CALCIUM SERPL-MCNC: 9.3 MG/DL — SIGNIFICANT CHANGE UP (ref 8.5–10.1)
CHLORIDE SERPL-SCNC: 108 MMOL/L — SIGNIFICANT CHANGE UP (ref 96–108)
CHOLEST SERPL-MCNC: 181 MG/DL — SIGNIFICANT CHANGE UP
CO2 SERPL-SCNC: 31 MMOL/L — SIGNIFICANT CHANGE UP (ref 22–31)
CREAT SERPL-MCNC: 0.74 MG/DL — SIGNIFICANT CHANGE UP (ref 0.5–1.3)
EGFR: 97 ML/MIN/1.73M2 — SIGNIFICANT CHANGE UP
GLUCOSE SERPL-MCNC: 123 MG/DL — HIGH (ref 70–99)
HCT VFR BLD CALC: 38.3 % — SIGNIFICANT CHANGE UP (ref 34.5–45)
HDLC SERPL-MCNC: 69 MG/DL — SIGNIFICANT CHANGE UP
HGB BLD-MCNC: 12.5 G/DL — SIGNIFICANT CHANGE UP (ref 11.5–15.5)
LIPID PNL WITH DIRECT LDL SERPL: 95 MG/DL — SIGNIFICANT CHANGE UP
MCHC RBC-ENTMCNC: 31.3 PG — SIGNIFICANT CHANGE UP (ref 27–34)
MCHC RBC-ENTMCNC: 32.6 GM/DL — SIGNIFICANT CHANGE UP (ref 32–36)
MCV RBC AUTO: 96 FL — SIGNIFICANT CHANGE UP (ref 80–100)
NON HDL CHOLESTEROL: 112 MG/DL — SIGNIFICANT CHANGE UP
PLATELET # BLD AUTO: 224 K/UL — SIGNIFICANT CHANGE UP (ref 150–400)
POTASSIUM SERPL-MCNC: 4.3 MMOL/L — SIGNIFICANT CHANGE UP (ref 3.5–5.3)
POTASSIUM SERPL-SCNC: 4.3 MMOL/L — SIGNIFICANT CHANGE UP (ref 3.5–5.3)
RBC # BLD: 3.99 M/UL — SIGNIFICANT CHANGE UP (ref 3.8–5.2)
RBC # FLD: 13.8 % — SIGNIFICANT CHANGE UP (ref 10.3–14.5)
SODIUM SERPL-SCNC: 143 MMOL/L — SIGNIFICANT CHANGE UP (ref 135–145)
TRIGL SERPL-MCNC: 84 MG/DL — SIGNIFICANT CHANGE UP
WBC # BLD: 8.32 K/UL — SIGNIFICANT CHANGE UP (ref 3.8–10.5)
WBC # FLD AUTO: 8.32 K/UL — SIGNIFICANT CHANGE UP (ref 3.8–10.5)

## 2022-06-22 PROCEDURE — 99233 SBSQ HOSP IP/OBS HIGH 50: CPT

## 2022-06-22 PROCEDURE — 99232 SBSQ HOSP IP/OBS MODERATE 35: CPT

## 2022-06-22 PROCEDURE — 93306 TTE W/DOPPLER COMPLETE: CPT | Mod: 26

## 2022-06-22 RX ORDER — LANOLIN ALCOHOL/MO/W.PET/CERES
3 CREAM (GRAM) TOPICAL AT BEDTIME
Refills: 0 | Status: DISCONTINUED | OUTPATIENT
Start: 2022-06-22 | End: 2022-06-23

## 2022-06-22 RX ORDER — IBUPROFEN 200 MG
400 TABLET ORAL ONCE
Refills: 0 | Status: COMPLETED | OUTPATIENT
Start: 2022-06-22 | End: 2022-06-22

## 2022-06-22 RX ORDER — OXYCODONE AND ACETAMINOPHEN 5; 325 MG/1; MG/1
1 TABLET ORAL ONCE
Refills: 0 | Status: COMPLETED | OUTPATIENT
Start: 2022-06-22 | End: 2022-06-22

## 2022-06-22 RX ORDER — PANTOPRAZOLE SODIUM 20 MG/1
40 TABLET, DELAYED RELEASE ORAL
Refills: 0 | Status: DISCONTINUED | OUTPATIENT
Start: 2022-06-22 | End: 2022-06-23

## 2022-06-22 RX ADMIN — PANTOPRAZOLE SODIUM 40 MILLIGRAM(S): 20 TABLET, DELAYED RELEASE ORAL at 16:19

## 2022-06-22 RX ADMIN — CHLORHEXIDINE GLUCONATE 1 APPLICATION(S): 213 SOLUTION TOPICAL at 13:09

## 2022-06-22 RX ADMIN — Medication 400 MILLIGRAM(S): at 22:44

## 2022-06-22 RX ADMIN — Medication 400 MILLIGRAM(S): at 21:22

## 2022-06-22 RX ADMIN — Medication 650 MILLIGRAM(S): at 16:18

## 2022-06-22 RX ADMIN — ATORVASTATIN CALCIUM 80 MILLIGRAM(S): 80 TABLET, FILM COATED ORAL at 21:23

## 2022-06-22 RX ADMIN — Medication 650 MILLIGRAM(S): at 11:03

## 2022-06-22 RX ADMIN — Medication 3 MILLIGRAM(S): at 21:22

## 2022-06-22 RX ADMIN — Medication 81 MILLIGRAM(S): at 10:57

## 2022-06-22 NOTE — PHYSICAL THERAPY INITIAL EVALUATION ADULT - GENERAL OBSERVATIONS, REHAB EVAL
Patient received in bed on 3N, +HM.  Patient Slovak speaking. PI used to translate.  ID# [id number].  Patient c/o HA pain from posterior neck to head, increased with Cervical ROM, including clicking and "noise".

## 2022-06-22 NOTE — SWALLOW BEDSIDE ASSESSMENT ADULT - NS SPL SWALLOW CLINIC TRIAL FT
Pt presents with oral-pharyngeal function and speech-language function WNL.  RX Regular consistency diet.  meds as tolerated.  GERD precautions.   Disc with Pt and NSg.  Service will not follow. Please re-consult  prn.
Yes

## 2022-06-22 NOTE — PROGRESS NOTE ADULT - ASSESSMENT
# TIA  p/w dizzines, weakness - symptoms resolved at present   MRI/MR - negative fro stroke   -currently neurologically intact  - Speech - passed, has GERD   -PT evaluation  - check TSH, Folate, B12, HgA1C  - check lipid panel - if normal - will decrease lipitor dose   - Neurology consult      # GERD  - startted on protonix     # Headaches   pt. with headache with nausea after traumatic life event - death of her 17 yo son   MRI/MRA head and neck - no CVA - no tumors   check EEG - D/W Dr. Overton   Neurology consult     # Near syncope  # Chest pain   # Palpitations - suspect due to anxiety   Trop neg x 2  no events on cardiac monitor   - check orthostatics  - check TSH, B12, Folate, lipid panel   -monitor on tele  - follow up echo   - cardiology consult     # Prediabetes  -hold metformin  -ISS  check HgA1C

## 2022-06-22 NOTE — SWALLOW BEDSIDE ASSESSMENT ADULT - PHARYNGEAL PHASE
onset of pharyngeal swallow timely, even for multiple successive swallows of thin liquid./Within functional limits

## 2022-06-22 NOTE — SWALLOW BEDSIDE ASSESSMENT ADULT - SWALLOW EVAL: DIAGNOSIS
1. oral-pharyngeal swallow function WNL for REGULAR consistency diet.  (See below). 2. Speech-language function within normal limits for communication . Note Pt is Bangladeshi speaking.

## 2022-06-22 NOTE — SWALLOW BEDSIDE ASSESSMENT ADULT - COMMENTS
51 year old F , Taiwanese speaking pt: past medical history noted for prediabetes presented to the ED after she experienced a sudden onset of a pounding head that was associated with dizziness, unsteady gait, weakness, chest pain and palpitations while at work. Of note, patient endorses losing her son on May 8th 2022 and has been having intermittent chest pain and palpitations since then. She saw a Cardiologist one day prior to ED presentation and was given reassuring results. States she felt very unsteady on her feet but denies any slurred speech, facial drooping,, extremity weakness, loss of  strength or visual changes. States she felt as though she would fall when symptoms initially presented.   Workup on admission showed no acute cerebral pathology. Pt is seen by Service for oral-pharyngeal swallow function, and for speech-language.  Nsg was present at the session, and translated pt's verbal output.          In the ED patient found to have a NIHSS of 1. A CT head non-contrast was negative for acute

## 2022-06-22 NOTE — PROGRESS NOTE ADULT - SUBJECTIVE AND OBJECTIVE BOX
Chief Complaint: TIA/CVA    Subjective and objective   51 year old female patient with a pertinent past medical history noted for prediabetes presented to the ED after experienced a sudden onset of a pounding head that was associated with dizziness, unsteady gait, weakness, chest pain and palpitations while at work. Of note, patient endorses losing her son on May 8th 2022 and has been having intermittent chest pain and palpitations since then. She saw a Cardiologist one day prior to ED presentation and was given reassuring results. States she felt very unsteady on her feet but denies any slurred speech, facial drooping,, extremity weakness, loss of  strength or visual changes. States she felt as though she would fall when symptoms initially presented    REVIEW OF SYSTEMS:  All other review of systems is negative unless indicated above    Vital Signs Last 24 Hrs  T(C): 36.7 (22 Jun 2022 09:20), Max: 36.7 (21 Jun 2022 15:12)  T(F): 98.1 (22 Jun 2022 09:20), Max: 98.1 (22 Jun 2022 09:20)  HR: 80 (22 Jun 2022 09:20) (65 - 89)  BP: 115/85 (22 Jun 2022 09:20) (102/45 - 133/98)  BP(mean): --  RR: 18 (22 Jun 2022 09:20) (18 - 18)  SpO2: 97% (22 Jun 2022 09:20) (97% - 100%)    I&O's Summary      CAPILLARY BLOOD GLUCOSE      POCT Blood Glucose.: 96 mg/dL (22 Jun 2022 13:19)  POCT Blood Glucose.: 120 mg/dL (22 Jun 2022 08:55)  POCT Blood Glucose.: 142 mg/dL (22 Jun 2022 04:10)  POCT Blood Glucose.: 138 mg/dL (21 Jun 2022 20:34)      PHYSICAL EXAM:    Constitutional: NAD, awake and alert, well-developed  HEENT: PERR, EOMI, Normal Hearing, MMM  Neck: Soft and supple, No LAD, No JVD  Respiratory: Breath sounds are clear bilaterally, No wheezing, rales or rhonchi  Cardiovascular: S1 and S2, regular rate and rhythm, no Murmurs, gallops or rubs  Gastrointestinal: Bowel Sounds present, soft, nontender, nondistended, no guarding, no rebound  Extremities: No peripheral edema  Vascular: 2+ peripheral pulses  Neurological: A/O x 3, no focal deficits  Musculoskeletal: 5/5 strength b/l upper and lower extremities  Skin: No rashes    Medications:  MEDICATIONS  (STANDING):  aspirin enteric coated 81 milliGRAM(s) Oral daily  atorvastatin 80 milliGRAM(s) Oral at bedtime  chlorhexidine 4% Liquid 1 Application(s) Topical daily  dextrose 5%. 1000 milliLiter(s) (100 mL/Hr) IV Continuous <Continuous>  dextrose 5%. 1000 milliLiter(s) (50 mL/Hr) IV Continuous <Continuous>  dextrose 50% Injectable 25 Gram(s) IV Push once  dextrose 50% Injectable 12.5 Gram(s) IV Push once  dextrose 50% Injectable 25 Gram(s) IV Push once  glucagon  Injectable 1 milliGRAM(s) IntraMuscular once  influenza   Vaccine 0.5 milliLiter(s) IntraMuscular once  insulin lispro (ADMELOG) corrective regimen sliding scale   SubCutaneous three times a day before meals  oxycodone    5 mG/acetaminophen 325 mG 1 Tablet(s) Oral once  pantoprazole    Tablet 40 milliGRAM(s) Oral before breakfast      Labs: All Labs Reviewed:                        12.5   8.32  )-----------( 224      ( 22 Jun 2022 09:23 )             38.3     06-22    143  |  108  |  25<H>  ----------------------------<  123<H>  4.3   |  31  |  0.74    Ca    9.3      22 Jun 2022 09:23    TPro  6.9  /  Alb  3.3  /  TBili  0.3  /  DBili  x   /  AST  14<L>  /  ALT  34  /  AlkPhos  91  06-21    PT/INR - ( 21 Jun 2022 15:00 )   PT: 10.2 sec;   INR: 0.88 ratio         PTT - ( 22 Jun 2022 09:23 )  PTT:27.0 sec      RADIOLOGY/EKG: all tests were reviewed   r< from: MR Angio Neck No Cont (06.21.22 @ 21:30) >  IMPRESSION:  No acute intracranial abnormality.  Intracranial and extracranial MR angiography is unremarkable by NASCET   criteria.    < end of copied text >        DVT PPX: ambulation     Advance Directive: full code     Disposition: EEG

## 2022-06-22 NOTE — PHYSICAL THERAPY INITIAL EVALUATION ADULT - MODALITIES TREATMENT COMMENTS
Patient with HA pain increased with CS movement, reported neck pain and long h/o L hand paresthesias in index finger and thumb.  Patient returned to bed by request, call bell in reach and bed alarm active. Patient independent in functional mobility, no skilled physical therapy need in this setting.  May ambulate per nursing.  Will d/c from PT. RN, CM informed.

## 2022-06-22 NOTE — PHYSICAL THERAPY INITIAL EVALUATION ADULT - PERTINENT HX OF CURRENT PROBLEM, REHAB EVAL
51 year old female patient with a pertinent past medical history noted for prediabetes presented to the ED after experienced a sudden onset of a pounding head that was associated with dizziness, unsteady gait, weakness, chest pain and palpitations while at work. Of note, patient endorses losing her son on May 8th 2022 and has been having intermittent chest pain and palpitations since then.  CT, MRI Head (-).

## 2022-06-22 NOTE — SWALLOW BEDSIDE ASSESSMENT ADULT - SLP GENERAL OBSERVATIONS
ON encounter, pt was lying on her side in bed, shielding her eyes:  alert, verbally interactive: able to maintain conversation with appropriate Q/A and turn taking with NSg in Syriac. pt presents with no evidence of primary linguistic pathology, no evidence of motor speech defciits: As per Nsg, on being questioned, pt has full sentence structure, no evidence of deficits in lexical retrieval, no dysarthria no apraxia of motor speech.   Speech-language is WNL.  pt sdtated that she has a thyroid nodule which occasionally makes it hard to swallow, but did not elaborate.  Also stated that she has the sensation at times of food getting stuck, pointing to her sternum in verification. (THis I c/w esophageal dysfunction.) .

## 2022-06-23 ENCOUNTER — TRANSCRIPTION ENCOUNTER (OUTPATIENT)
Age: 52
End: 2022-06-23

## 2022-06-23 VITALS — HEART RATE: 94 BPM

## 2022-06-23 LAB
A1C WITH ESTIMATED AVERAGE GLUCOSE RESULT: 6.5 % — HIGH (ref 4–5.6)
ANION GAP SERPL CALC-SCNC: 3 MMOL/L — LOW (ref 5–17)
BUN SERPL-MCNC: 18 MG/DL — SIGNIFICANT CHANGE UP (ref 7–23)
CALCIUM SERPL-MCNC: 9.2 MG/DL — SIGNIFICANT CHANGE UP (ref 8.5–10.1)
CHLORIDE SERPL-SCNC: 108 MMOL/L — SIGNIFICANT CHANGE UP (ref 96–108)
CO2 SERPL-SCNC: 31 MMOL/L — SIGNIFICANT CHANGE UP (ref 22–31)
CREAT SERPL-MCNC: 0.66 MG/DL — SIGNIFICANT CHANGE UP (ref 0.5–1.3)
EGFR: 105 ML/MIN/1.73M2 — SIGNIFICANT CHANGE UP
ESTIMATED AVERAGE GLUCOSE: 140 MG/DL — HIGH (ref 68–114)
FOLATE SERPL-MCNC: 14.9 NG/ML — SIGNIFICANT CHANGE UP
GLUCOSE SERPL-MCNC: 132 MG/DL — HIGH (ref 70–99)
HCT VFR BLD CALC: 39.8 % — SIGNIFICANT CHANGE UP (ref 34.5–45)
HGB BLD-MCNC: 12.7 G/DL — SIGNIFICANT CHANGE UP (ref 11.5–15.5)
MCHC RBC-ENTMCNC: 30.5 PG — SIGNIFICANT CHANGE UP (ref 27–34)
MCHC RBC-ENTMCNC: 31.9 GM/DL — LOW (ref 32–36)
MCV RBC AUTO: 95.4 FL — SIGNIFICANT CHANGE UP (ref 80–100)
PLATELET # BLD AUTO: 208 K/UL — SIGNIFICANT CHANGE UP (ref 150–400)
POTASSIUM SERPL-MCNC: 4.1 MMOL/L — SIGNIFICANT CHANGE UP (ref 3.5–5.3)
POTASSIUM SERPL-SCNC: 4.1 MMOL/L — SIGNIFICANT CHANGE UP (ref 3.5–5.3)
RBC # BLD: 4.17 M/UL — SIGNIFICANT CHANGE UP (ref 3.8–5.2)
RBC # FLD: 13.8 % — SIGNIFICANT CHANGE UP (ref 10.3–14.5)
SODIUM SERPL-SCNC: 142 MMOL/L — SIGNIFICANT CHANGE UP (ref 135–145)
TSH SERPL-MCNC: 0.84 UU/ML — SIGNIFICANT CHANGE UP (ref 0.34–4.82)
VIT B12 SERPL-MCNC: 565 PG/ML — SIGNIFICANT CHANGE UP (ref 232–1245)
WBC # BLD: 5.55 K/UL — SIGNIFICANT CHANGE UP (ref 3.8–10.5)
WBC # FLD AUTO: 5.55 K/UL — SIGNIFICANT CHANGE UP (ref 3.8–10.5)

## 2022-06-23 PROCEDURE — 99239 HOSP IP/OBS DSCHRG MGMT >30: CPT

## 2022-06-23 PROCEDURE — 95816 EEG AWAKE AND DROWSY: CPT | Mod: 26

## 2022-06-23 RX ORDER — PANTOPRAZOLE SODIUM 20 MG/1
1 TABLET, DELAYED RELEASE ORAL
Qty: 30 | Refills: 0
Start: 2022-06-23 | End: 2022-07-22

## 2022-06-23 RX ORDER — ATORVASTATIN CALCIUM 80 MG/1
1 TABLET, FILM COATED ORAL
Qty: 30 | Refills: 0
Start: 2022-06-23 | End: 2022-07-22

## 2022-06-23 RX ORDER — ATORVASTATIN CALCIUM 80 MG/1
20 TABLET, FILM COATED ORAL AT BEDTIME
Refills: 0 | Status: DISCONTINUED | OUTPATIENT
Start: 2022-06-23 | End: 2022-06-23

## 2022-06-23 RX ORDER — ASPIRIN/CALCIUM CARB/MAGNESIUM 324 MG
1 TABLET ORAL
Qty: 0 | Refills: 0 | DISCHARGE
Start: 2022-06-23

## 2022-06-23 RX ADMIN — Medication 81 MILLIGRAM(S): at 09:25

## 2022-06-23 RX ADMIN — PANTOPRAZOLE SODIUM 40 MILLIGRAM(S): 20 TABLET, DELAYED RELEASE ORAL at 09:25

## 2022-06-23 NOTE — DISCHARGE NOTE NURSING/CASE MANAGEMENT/SOCIAL WORK - PATIENT PORTAL LINK FT
You can access the FollowMyHealth Patient Portal offered by [facility] by registering at the following website: [email or web address]. By joining [facility]’s FollowMyHealth portal, you will also be able to view your health information using other applications (apps) compatible with our system.

## 2022-06-23 NOTE — DISCHARGE NOTE PROVIDER - NSDCFUSCHEDAPPT_GEN_ALL_CORE_FT
[facility]  [facility] [id number]  Scheduled Appointment: 07/05/2022

## 2022-06-23 NOTE — DISCHARGE NOTE PROVIDER - HOSPITAL COURSE
51 year old female patient with a pertinent past medical history noted for prediabetes presented to the ED after experienced a sudden onset of a pounding head that was associated with dizziness, unsteady gait, weakness, chest pain and palpitations while at work. Of note, patient endorses losing her son on May 8th 2022 and has been having intermittent chest pain and palpitations since then. She saw a Cardiologist one day prior to ED presentation and was given reassuring results. States she felt very unsteady on her feet but denies any slurred speech, facial drooping,, extremity weakness, loss of  strength or visual changes. States she felt as though she would fall when symptoms initially presented.    # TIA  p/w dizzines, weakness - symptoms resolved at present   MRI/MR - negative fro stroke   -currently neurologically intact  - Speech - passed, has GERD   - TSH - wnl    - Folate, B12 - pending - outpatient follow up with neurology   - BoL8L=0.5  - lipid panel - normal - lipitor decreased to 20 mg po HS  - cont. asa and statin   - Neurology follow up outpatient     # GERD  - started on protonix     # Headaches   pt. with headache with nausea after traumatic life event - death of her 17 yo son   MRI/MRA head and neck - no CVA - no tumors   EEG -  normal D/W Dr. Overton   follow up with enurology     # Near syncope  # Chest pain   # Palpitations - suspect due to anxiety   Trop neg x 2  no events on cardiac monitor   - Echo -  prserved EF, no acute findings   - outpatient follow up with cardiology - Dr. Manuel     # Prediabetes  HgA1C is 6.5  Resume metformin on discharge     PHYSICAL EXAM:  Vital Signs Last 24 Hrs  T(C): 36.5 (23 Jun 2022 08:05), Max: 37.2 (22 Jun 2022 20:10)  T(F): 97.7 (23 Jun 2022 08:05), Max: 98.9 (22 Jun 2022 20:10)  HR: 94 (23 Jun 2022 08:30) (72 - 99)  BP: 112/74 (23 Jun 2022 08:05) (101/57 - 120/66)  BP(mean): --  RR: 18 (23 Jun 2022 08:05) (18 - 18)  SpO2: 96% (23 Jun 2022 08:05) (95% - 98%)  Constitutional: NAD, awake and alert, well-developed  HEENT: PERR, EOMI, Normal Hearing, MMM  Neck: Soft and supple, No LAD, No JVD  Respiratory: Breath sounds are clear bilaterally, No wheezing, rales or rhonchi  Cardiovascular: S1 and S2, regular rate and rhythm, no Murmurs, gallops or rubs  Gastrointestinal: Bowel Sounds present, soft, nontender, nondistended, no guarding, no rebound  Extremities: No peripheral edema  Vascular: 2+ peripheral pulses  Neurological: A/O x 3, no focal deficits  Musculoskeletal: 5/5 strength b/l upper and lower extremities  Skin: No rashes   51 year old female patient with a pertinent past medical history noted for prediabetes presented to the ED after experienced a sudden onset of a pounding head that was associated with dizziness, unsteady gait, weakness, chest pain and palpitations while at work. Of note, patient endorses losing her son on May 8th 2022 and has been having intermittent chest pain and palpitations since then. She saw a Cardiologist one day prior to ED presentation and was given reassuring results. States she felt very unsteady on her feet but denies any slurred speech, facial drooping,, extremity weakness, loss of  strength or visual changes. States she felt as though she would fall when symptoms initially presented.    Care discussed with NP / Cardiology.  Patient denies any HA, CP, SOB. No new overnight events. D/C planning.     # TIA  p/w dizzines, weakness - symptoms resolved at present   MRI/MR - negative fro stroke   -currently neurologically intact  - Speech - passed, has GERD   - TSH - wnl    - Folate, B12 - pending - outpatient follow up with neurology   - YcC5I=2.5  - lipid panel - normal - lipitor decreased to 20 mg po HS  - cont. asa and statin   - Neurology follow up outpatient     # GERD  - started on protonix     # Headaches   pt. with headache with nausea after traumatic life event - death of her 15 yo son   MRI/MRA head and neck - no CVA - no tumors   EEG -  normal D/W Dr. Overton   follow up with enurology     # Near syncope  # Chest pain   # Palpitations - suspect due to anxiety   Trop neg x 2  no events on cardiac monitor   - Echo -  prserved EF, no acute findings   - outpatient follow up with cardiology - Dr. Manuel     # Prediabetes  HgA1C is 6.5  Resume metformin on discharge     PHYSICAL EXAM:  Vital Signs Last 24 Hrs  T(C): 36.5 (23 Jun 2022 08:05), Max: 37.2 (22 Jun 2022 20:10)  T(F): 97.7 (23 Jun 2022 08:05), Max: 98.9 (22 Jun 2022 20:10)  HR: 94 (23 Jun 2022 08:30) (72 - 99)  BP: 112/74 (23 Jun 2022 08:05) (101/57 - 120/66)  BP(mean): --  RR: 18 (23 Jun 2022 08:05) (18 - 18)  SpO2: 96% (23 Jun 2022 08:05) (95% - 98%)  Constitutional: NAD, awake and alert, well-developed  HEENT: PERR, EOMI, Normal Hearing, MMM  Neck: Soft and supple, No LAD, No JVD  Respiratory: Breath sounds are clear bilaterally, No wheezing, rales or rhonchi  Cardiovascular: S1 and S2, regular rate and rhythm, no Murmurs, gallops or rubs  Gastrointestinal: Bowel Sounds present, soft, nontender, nondistended, no guarding, no rebound  Extremities: No peripheral edema  Vascular: 2+ peripheral pulses  Neurological: A/O x 3, no focal deficits  Musculoskeletal: 5/5 strength b/l upper and lower extremities  Skin: No rashes

## 2022-06-23 NOTE — CONSULT NOTE ADULT - SUBJECTIVE AND OBJECTIVE BOX
Patient is a 51y old  Female who presents with a chief complaint of TIA/ CVA (22 Jun 2022 15:06)    ________________________________  GLORIA MOODY is a 51y year old Female with a past medical history of     HPI:  51 year old female patient with a pertinent past medical history noted for prediabetes presented to the ED after experienced a sudden onset of a pounding head that was associated with dizziness, unsteady gait, weakness, chest pain and palpitations while at work. Of note, patient endorses losing her son on May 8th 2022 and has been having intermittent chest pain and palpitations since then. She saw a Cardiologist one day prior to ED presentation and was given reassuring results. States she felt very unsteady on her feet but denies any slurred speech, facial drooping,, extremity weakness, loss of  strength or visual changes. States she felt as though she would fall when symptoms initially presented.        In the ED patient found to have a NIHSS of 1. A CT head non-contrast was negative for acute pathology. (21 Jun 2022 17:42)      PREVIOUS CARDIAC WORKUP:    Echocardiogram  Stress Test  Cardiac Catheterization  ________________________________  Review of systems: A 10 point review of system has been performed, and is negative except for what has been mentioned in the above history of present illness.     PAST MEDICAL & SURGICAL HISTORY:  DM (diabetes mellitus)      S/P cholecystectomy        FAMILY HISTORY:  FH: CAD (coronary artery disease) (Grandparent)    FHx: stroke (Mother)       The patient denies any history of premature CAD or sudden cardiac death.    SOCIAL HISTORY: The patient denies any history of tobacco abuse, alcohol abuse or illicit drug use.    ALLERGIES:  No Known Allergies    Home Medications:  metFORMIN 500 mg oral tablet: 1 tab(s) orally once a day (21 Jun 2022 15:48)  Tylenol 325 mg oral tablet: 2 tab(s) orally every 4 hours, As Needed (21 Jun 2022 15:48)    MEDICATIONS  (STANDING):  aspirin enteric coated 81 milliGRAM(s) Oral daily  atorvastatin 80 milliGRAM(s) Oral at bedtime  chlorhexidine 4% Liquid 1 Application(s) Topical daily  dextrose 5%. 1000 milliLiter(s) (100 mL/Hr) IV Continuous <Continuous>  dextrose 5%. 1000 milliLiter(s) (50 mL/Hr) IV Continuous <Continuous>  dextrose 50% Injectable 25 Gram(s) IV Push once  dextrose 50% Injectable 12.5 Gram(s) IV Push once  dextrose 50% Injectable 25 Gram(s) IV Push once  glucagon  Injectable 1 milliGRAM(s) IntraMuscular once  influenza   Vaccine 0.5 milliLiter(s) IntraMuscular once  insulin lispro (ADMELOG) corrective regimen sliding scale   SubCutaneous three times a day before meals  pantoprazole    Tablet 40 milliGRAM(s) Oral before breakfast    MEDICATIONS  (PRN):  acetaminophen     Tablet .. 650 milliGRAM(s) Oral every 6 hours PRN Mild Pain (1 - 3)  dextrose Oral Gel 15 Gram(s) Oral once PRN Blood Glucose LESS THAN 70 milliGRAM(s)/deciliter  guaiFENesin Oral Liquid (Sugar-Free) 100 milliGRAM(s) Oral every 6 hours PRN Cough  melatonin 3 milliGRAM(s) Oral at bedtime PRN Insomnia  ondansetron Injectable 4 milliGRAM(s) IV Push every 6 hours PRN Nausea and/or Vomiting    Vital Signs Last 24 Hrs  T(C): 36.5 (23 Jun 2022 08:05), Max: 37.2 (22 Jun 2022 20:10)  T(F): 97.7 (23 Jun 2022 08:05), Max: 98.9 (22 Jun 2022 20:10)  HR: 72 (23 Jun 2022 05:57) (72 - 99)  BP: 112/74 (23 Jun 2022 08:05) (101/57 - 120/66)  BP(mean): --  RR: 18 (23 Jun 2022 08:05) (18 - 18)  SpO2: 96% (23 Jun 2022 08:05) (95% - 98%)  I&O's Summary    ________________________________  GENERAL APPEARANCE:  No acute distress  HEAD: normocephalic, atraumatic  NECK: supple, no jugular venous distention, no carotid bruit    HEART: Regular rate and rhythm, S1, S2 normal, 1/6 murmur    CHEST:  No anterior chest wall tenderness    LUNGS:  Clear to auscultation, without any wheezing, rhonchi or rales    ABDOMEN: soft, nontender, nondistended, with positive bowel sounds appreciated  EXTREMITIES: no edema.   NEURO: Alert and oriented x3  PSYC:  Normal affect  SKIN:  Dry  ________________________________   TELEMETRY:    ECG:    LABS:                        12.7   5.55  )-----------( 208      ( 23 Jun 2022 08:13 )             39.8             06-23    142  |  108  |  18  ----------------------------<  132<H>  4.1   |  31  |  0.66    Ca    9.2      23 Jun 2022 08:13    TPro  6.9  /  Alb  3.3  /  TBili  0.3  /  DBili  x   /  AST  14<L>  /  ALT  34  /  AlkPhos  91  06-21      Lipid Panel  Chl 181  HDL 69  LDL --  Trg 84  LIVER FUNCTIONS - ( 21 Jun 2022 15:00 )  Alb: 3.3 g/dL / Pro: 6.9 gm/dL / ALK PHOS: 91 U/L / ALT: 34 U/L / AST: 14 U/L / GGT: x         PT/INR - ( 21 Jun 2022 15:00 )   PT: 10.2 sec;   INR: 0.88 ratio         PTT - ( 22 Jun 2022 09:23 )  PTT:27.0 sec      PT/INR - ( 21 Jun 2022 15:00 )   PT: 10.2 sec;   INR: 0.88 ratio         PTT - ( 22 Jun 2022 09:23 )  PTT:27.0 sec    ________________________________    RADIOLOGY & ADDITIONAL STUDIES:   IMPRESSION:  No acute intracranial abnormality.  Intracranial and extracranial MR angiography is unremarkable by NASCET     Brain MRA:  Internal carotid arteries demonstrate unremarkable antegrade flow related   enhancement to the [place] bilaterally. The anterior and middle   cerebral arteries  and anterior communicating artery are unremarkable.   There is no evidence of aneurysm, vascular malformation or occlusion.    The vertebral arteries are unremarkable to the vertebrobasilar   confluence. Branch vasculature of the posterior circulation is within   normal limits.    Neck MRA:  The aortic arch and origins of the great vessels are unremarkable.    Common and Internal Carotids: The origin and course and caliber of the   common andinternal carotid arteries is unremarkable.  There is no   significant stenosis by NASCET criteria along origin of either right or   left internal or external carotid artery.    Vertebral arteries:  The origin and course and caliber of the  vertebral arteries is   unremarkable, both vessels are patent to the intracranial circulation and   vertebrobasilar confluence. The left vertebral artery is dominant.    IMPRESSION:  1. Calcified granuloma in the left lower lobe, unchanged.  2. Mildly elevated right hemidiaphragm, unchanged.  3. No acute cardiopulmonary abnormalities are seen.    ________________________________    ASSESSMENT:  Chest pain  Palpitations  Dizziness - orthostat neg      PLAN:  In summary, this is a 51y Female with a past medical history of       ____________________________________________  (Dragon Dictation software used). Thank you for allowing me to participate in the care of your patient. Please contact me should any questions arise.    KARI Manuel DO, [facility]  Office: [phone]    Patient is a 51y old  Female who presents with a chief complaint of TIA/ CVA (22 Jun 2022 15:06)    ________________________________  Ms. Ernandez is a 51 y.o. female with a past medical history of prediabetes, depression, vitamin D deficiency and obesity.  She was seen in our office chest discomfort, and underwent an echocardiogram showed no abnormalities.    Since then, she presented today ED after experienced a sudden onset of a pounding head that was associated with dizziness, unsteady gait, weakness, chest pain and palpitations while at work. Of note, patient endorses losing her son on May 8th 2022 and has been having intermittent chest pain and palpitations since then. She saw a Cardiologist one day prior to ED presentation and was given reassuring results. States she felt very unsteady on her feet but denies any slurred speech, facial drooping,, extremity weakness, loss of  strength or visual changes. States she felt as though she would fall when symptoms initially presented.    Cardiac enzymes negative.  CT negative from the 21st.  MRI/MRA brain negative.  Symptoms improving.  Repeat echocardiac done in the hospital showed no change.         ________________________________  Review of systems: A 10 point review of system has been performed, and is negative except for what has been mentioned in the above history of present illness.     PAST MEDICAL & SURGICAL HISTORY:  DM (diabetes mellitus)      S/P cholecystectomy        FAMILY HISTORY:  FH: CAD (coronary artery disease) (Grandparent)    FHx: stroke (Mother)       The patient denies any history of premature CAD or sudden cardiac death.    SOCIAL HISTORY: The patient denies any history of tobacco abuse, alcohol abuse or illicit drug use.    ALLERGIES:  No Known Allergies    Home Medications:  metFORMIN 500 mg oral tablet: 1 tab(s) orally once a day (21 Jun 2022 15:48)  Tylenol 325 mg oral tablet: 2 tab(s) orally every 4 hours, As Needed (21 Jun 2022 15:48)    MEDICATIONS  (STANDING):  aspirin enteric coated 81 milliGRAM(s) Oral daily  atorvastatin 80 milliGRAM(s) Oral at bedtime  chlorhexidine 4% Liquid 1 Application(s) Topical daily  dextrose 5%. 1000 milliLiter(s) (100 mL/Hr) IV Continuous <Continuous>  dextrose 5%. 1000 milliLiter(s) (50 mL/Hr) IV Continuous <Continuous>  dextrose 50% Injectable 25 Gram(s) IV Push once  dextrose 50% Injectable 12.5 Gram(s) IV Push once  dextrose 50% Injectable 25 Gram(s) IV Push once  glucagon  Injectable 1 milliGRAM(s) IntraMuscular once  influenza   Vaccine 0.5 milliLiter(s) IntraMuscular once  insulin lispro (ADMELOG) corrective regimen sliding scale   SubCutaneous three times a day before meals  pantoprazole    Tablet 40 milliGRAM(s) Oral before breakfast    MEDICATIONS  (PRN):  acetaminophen     Tablet .. 650 milliGRAM(s) Oral every 6 hours PRN Mild Pain (1 - 3)  dextrose Oral Gel 15 Gram(s) Oral once PRN Blood Glucose LESS THAN 70 milliGRAM(s)/deciliter  guaiFENesin Oral Liquid (Sugar-Free) 100 milliGRAM(s) Oral every 6 hours PRN Cough  melatonin 3 milliGRAM(s) Oral at bedtime PRN Insomnia  ondansetron Injectable 4 milliGRAM(s) IV Push every 6 hours PRN Nausea and/or Vomiting    Vital Signs Last 24 Hrs  T(C): 36.5 (23 Jun 2022 08:05), Max: 37.2 (22 Jun 2022 20:10)  T(F): 97.7 (23 Jun 2022 08:05), Max: 98.9 (22 Jun 2022 20:10)  HR: 72 (23 Jun 2022 05:57) (72 - 99)  BP: 112/74 (23 Jun 2022 08:05) (101/57 - 120/66)  BP(mean): --  RR: 18 (23 Jun 2022 08:05) (18 - 18)  SpO2: 96% (23 Jun 2022 08:05) (95% - 98%)  I&O's Summary    ________________________________  GENERAL APPEARANCE:  No acute distress  HEAD: normocephalic, atraumatic  NECK: supple, no jugular venous distention, no carotid bruit    HEART: Regular rate and rhythm, S1, S2 normal, 1/6 murmur    CHEST:  No anterior chest wall tenderness    LUNGS:  Clear to auscultation, without any wheezing, rhonchi or rales    ABDOMEN: soft, nontender, nondistended, with positive bowel sounds appreciated  EXTREMITIES: no edema.   NEURO: Alert and oriented x3  PSYC:  Normal affect  SKIN:  Dry  ________________________________   TELEMETRY: Sinus rhythm    ECG: Sinus rhythm with no acute abnormalities    LABS:                        12.7   5.55  )-----------( 208      ( 23 Jun 2022 08:13 )             39.8             06-23    142  |  108  |  18  ----------------------------<  132<H>  4.1   |  31  |  0.66    Ca    9.2      23 Jun 2022 08:13    TPro  6.9  /  Alb  3.3  /  TBili  0.3  /  DBili  x   /  AST  14<L>  /  ALT  34  /  AlkPhos  91  06-21      Lipid Panel  Chl 181  HDL 69  LDL --  Trg 84  LIVER FUNCTIONS - ( 21 Jun 2022 15:00 )  Alb: 3.3 g/dL / Pro: 6.9 gm/dL / ALK PHOS: 91 U/L / ALT: 34 U/L / AST: 14 U/L / GGT: x         PT/INR - ( 21 Jun 2022 15:00 )   PT: 10.2 sec;   INR: 0.88 ratio         PTT - ( 22 Jun 2022 09:23 )  PTT:27.0 sec      PT/INR - ( 21 Jun 2022 15:00 )   PT: 10.2 sec;   INR: 0.88 ratio         PTT - ( 22 Jun 2022 09:23 )  PTT:27.0 sec    ________________________________    RADIOLOGY & ADDITIONAL STUDIES:   IMPRESSION:  No acute intracranial abnormality.  Intracranial and extracranial MR angiography is unremarkable by NASCET     Brain MRA:  Internal carotid arteries demonstrate unremarkable antegrade flow related   enhancement to the [place] bilaterally. The anterior and middle   cerebral arteries  and anterior communicating artery are unremarkable.   There is no evidence of aneurysm, vascular malformation or occlusion.    The vertebral arteries are unremarkable to the vertebrobasilar   confluence. Branch vasculature of the posterior circulation is within   normal limits.    Neck MRA:  The aortic arch and origins of the great vessels are unremarkable.    Common and Internal Carotids: The origin and course and caliber of the   common andinternal carotid arteries is unremarkable.  There is no   significant stenosis by NASCET criteria along origin of either right or   left internal or external carotid artery.    Vertebral arteries:  The origin and course and caliber of the  vertebral arteries is   unremarkable, both vessels are patent to the intracranial circulation and   vertebrobasilar confluence. The left vertebral artery is dominant.    IMPRESSION:  1. Calcified granuloma in the left lower lobe, unchanged.  2. Mildly elevated right hemidiaphragm, unchanged.  3. No acute cardiopulmonary abnormalities are seen.    ________________________________    ASSESSMENT:  Chest pain  Palpitations  Dizziness - orthostat neg      PLAN:  In summary, this is a 51y Female with a past medical history of prediabetes, recently lost her son, presenting with chest discomfort, dizziness.  Thus far objective work-up negative including echo and MRI/MRA.  Orthostatic vital signs negative.  Given cardiac risk factors will obtain outpatient stress test.  However most likely related to stress due to loss of her son.    Unlikely secondary to cardiac dysrhythmia given no events on telemetry.  Discharge planning per primary      ____________________________________________  (Dragon Dictation software used). Thank you for allowing me to participate in the care of your patient. Please contact me should any questions arise.    KARI Manuel DO, [facility]  Office: [phone]

## 2022-06-23 NOTE — DISCHARGE NOTE PROVIDER - NSDCCAREPROVSEEN_GEN_ALL_CORE_FT
Dr. Newsome made aware of critical results from bone marrow results  
Hussein, Karthik Mejia, Tony Melgar MD, Alexandra NP

## 2022-06-23 NOTE — DISCHARGE NOTE PROVIDER - NSDCMRMEDTOKEN_GEN_ALL_CORE_FT
aspirin 81 mg oral delayed release tablet: 1 tab(s) orally once a day  atorvastatin 20 mg oral tablet: 1 tab(s) orally once a day (at bedtime)  metFORMIN 500 mg oral tablet: 1 tab(s) orally once a day  pantoprazole 40 mg oral delayed release tablet: 1 tab(s) orally once a day (before a meal)  Tylenol 325 mg oral tablet: 2 tab(s) orally every 4 hours, As Needed

## 2022-06-23 NOTE — DISCHARGE NOTE PROVIDER - CARE PROVIDER_API CALL
Belkis Manuel (DO; NADIYA)  Cardiology  [address] [place] [address]  Phone: [phone]  Fax: [phone]  Follow Up Time:     Tony Overton)  Internal Medicine; Neurology  [address] [facility] [facility], [address]  [place] [address]  Phone: [phone]  Fax: [phone]  Follow Up Time:     Lucia Angeles)  Family Medicine  [address] [place] [address]  Phone: [phone]  Fax: [phone]  Follow Up Time:

## 2022-06-23 NOTE — DISCHARGE NOTE NURSING/CASE MANAGEMENT/SOCIAL WORK - NSDCPEFALRISK_GEN_ALL_CORE
For information on Fall & Injury Prevention, visit: [email or web address] OR  [email or web address] OR  https://www.cdc.gov/steadi/patient.html

## 2022-06-23 NOTE — DISCHARGE NOTE PROVIDER - PROVIDER TOKENS
PROVIDER:[TOKEN:[[id number]]],PROVIDER:[TOKEN:[[id number]]],PROVIDER:[TOKEN:[[id number]]]

## 2022-06-23 NOTE — DISCHARGE NOTE PROVIDER - CARE PROVIDERS DIRECT ADDRESSES
,DirectAddress_Unknown,[email or web address]
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
## 2022-06-23 NOTE — DISCHARGE NOTE NURSING/CASE MANAGEMENT/SOCIAL WORK - NSDCVIVACCINE_GEN_ALL_CORE_FT
influenza, injectable, quadrivalent, preservative free; 16-Sep-2018 14:16; Jaja Gray (BEKAH); [employer]; [id number] (Exp. Date: 30-Jun-2019); IntraMuscular; Deltoid Right.; 0.5 milliLiter(s); VIS (VIS Published: 07-Aug-2015, VIS Presented: 16-Sep-2018);

## 2022-06-30 DIAGNOSIS — Z90.49 ACQUIRED ABSENCE OF OTHER SPECIFIED PARTS OF DIGESTIVE TRACT: ICD-10-CM

## 2022-06-30 DIAGNOSIS — R73.03 PREDIABETES: ICD-10-CM

## 2022-06-30 DIAGNOSIS — G43.909 MIGRAINE, UNSPECIFIED, NOT INTRACTABLE, WITHOUT STATUS MIGRAINOSUS: ICD-10-CM

## 2022-06-30 DIAGNOSIS — G45.9 TRANSIENT CEREBRAL ISCHEMIC ATTACK, UNSPECIFIED: ICD-10-CM

## 2022-06-30 DIAGNOSIS — Z79.84 LONG TERM (CURRENT) USE OF ORAL HYPOGLYCEMIC DRUGS: ICD-10-CM

## 2022-06-30 DIAGNOSIS — R55 SYNCOPE AND COLLAPSE: ICD-10-CM

## 2022-06-30 DIAGNOSIS — R07.9 CHEST PAIN, UNSPECIFIED: ICD-10-CM

## 2022-06-30 DIAGNOSIS — K21.9 GASTRO-ESOPHAGEAL REFLUX DISEASE WITHOUT ESOPHAGITIS: ICD-10-CM

## 2022-06-30 DIAGNOSIS — F45.8 OTHER SOMATOFORM DISORDERS: ICD-10-CM

## 2022-07-05 ENCOUNTER — OUTPATIENT (OUTPATIENT)
Dept: OUTPATIENT SERVICES | Facility: HOSPITAL | Age: 52
LOS: 1 days | End: 2022-07-05
Payer: MEDICAID

## 2022-07-05 ENCOUNTER — APPOINTMENT (OUTPATIENT)
Dept: MAMMOGRAPHY | Facility: CLINIC | Age: 52
End: 2022-07-05

## 2022-07-05 DIAGNOSIS — E11.9 TYPE 2 DIABETES MELLITUS WITHOUT COMPLICATIONS: ICD-10-CM

## 2022-07-05 DIAGNOSIS — R07.89 OTHER CHEST PAIN: ICD-10-CM

## 2022-07-05 DIAGNOSIS — Z90.49 ACQUIRED ABSENCE OF OTHER SPECIFIED PARTS OF DIGESTIVE TRACT: Chronic | ICD-10-CM

## 2022-07-05 PROCEDURE — G0279: CPT

## 2022-07-05 PROCEDURE — 77066 DX MAMMO INCL CAD BI: CPT | Mod: 26

## 2022-07-05 PROCEDURE — G0279: CPT | Mod: 26

## 2022-07-05 PROCEDURE — 77066 DX MAMMO INCL CAD BI: CPT

## 2022-12-29 NOTE — SWALLOW BEDSIDE ASSESSMENT ADULT - SLP PRECAUTIONS/LIMITATIONS: VISION
photophobic at this time 2/2 severe HA Nasal Turnover Hinge Flap Text: The defect edges were debeveled with a #15 scalpel blade.  Given the size, depth, location of the defect and the defect being full thickness a nasal turnover hinge flap was deemed most appropriate.  Using a sterile surgical marker, an appropriate hinge flap was drawn incorporating the defect. The area thus outlined was incised with a #15 scalpel blade. The flap was designed to recreate the nasal mucosal lining and the alar rim. The skin margins were undermined to an appropriate distance in all directions utilizing iris scissors.

## 2023-02-16 NOTE — ED PROVIDER NOTE - CARDIAC, MLM
Normal rate, regular rhythm.  Heart sounds S1, S2.  No murmurs, rubs or gallops. Cyclosporine Pregnancy And Lactation Text: This medication is Pregnancy Category C and it isn't know if it is safe during pregnancy. This medication is excreted in breast milk.

## 2023-02-24 ENCOUNTER — EMERGENCY (EMERGENCY)
Facility: HOSPITAL | Age: 53
LOS: 0 days | Discharge: HOME | End: 2023-02-24
Attending: STUDENT IN AN ORGANIZED HEALTH CARE EDUCATION/TRAINING PROGRAM
Payer: MEDICAID

## 2023-02-24 VITALS
OXYGEN SATURATION: 100 % | RESPIRATION RATE: 16 BRPM | DIASTOLIC BLOOD PRESSURE: 79 MMHG | TEMPERATURE: 97 F | HEART RATE: 63 BPM | SYSTOLIC BLOOD PRESSURE: 103 MMHG

## 2023-02-24 VITALS
OXYGEN SATURATION: 96 % | RESPIRATION RATE: 19 BRPM | TEMPERATURE: 97 F | HEART RATE: 71 BPM | DIASTOLIC BLOOD PRESSURE: 70 MMHG | SYSTOLIC BLOOD PRESSURE: 122 MMHG

## 2023-02-24 DIAGNOSIS — G43.909 MIGRAINE, UNSPECIFIED, NOT INTRACTABLE, WITHOUT STATUS MIGRAINOSUS: ICD-10-CM

## 2023-02-24 DIAGNOSIS — R51.9 HEADACHE, UNSPECIFIED: ICD-10-CM

## 2023-02-24 DIAGNOSIS — Z79.84 LONG TERM (CURRENT) USE OF ORAL HYPOGLYCEMIC DRUGS: ICD-10-CM

## 2023-02-24 DIAGNOSIS — E11.9 TYPE 2 DIABETES MELLITUS WITHOUT COMPLICATIONS: ICD-10-CM

## 2023-02-24 DIAGNOSIS — Z79.82 LONG TERM (CURRENT) USE OF ASPIRIN: ICD-10-CM

## 2023-02-24 DIAGNOSIS — R11.2 NAUSEA WITH VOMITING, UNSPECIFIED: ICD-10-CM

## 2023-02-24 DIAGNOSIS — Z90.49 ACQUIRED ABSENCE OF OTHER SPECIFIED PARTS OF DIGESTIVE TRACT: Chronic | ICD-10-CM

## 2023-02-24 LAB
ALBUMIN SERPL ELPH-MCNC: 3.9 G/DL — SIGNIFICANT CHANGE UP (ref 3.3–5)
ALP SERPL-CCNC: 79 U/L — SIGNIFICANT CHANGE UP (ref 40–120)
ALT FLD-CCNC: 35 U/L — SIGNIFICANT CHANGE UP (ref 12–78)
ANION GAP SERPL CALC-SCNC: 2 MMOL/L — LOW (ref 5–17)
AST SERPL-CCNC: 19 U/L — SIGNIFICANT CHANGE UP (ref 15–37)
BASOPHILS # BLD AUTO: 0.02 K/UL — SIGNIFICANT CHANGE UP (ref 0–0.2)
BASOPHILS NFR BLD AUTO: 0.3 % — SIGNIFICANT CHANGE UP (ref 0–2)
BILIRUB SERPL-MCNC: 0.3 MG/DL — SIGNIFICANT CHANGE UP (ref 0.2–1.2)
BUN SERPL-MCNC: 18 MG/DL — SIGNIFICANT CHANGE UP (ref 7–23)
CALCIUM SERPL-MCNC: 9.4 MG/DL — SIGNIFICANT CHANGE UP (ref 8.5–10.1)
CHLORIDE SERPL-SCNC: 108 MMOL/L — SIGNIFICANT CHANGE UP (ref 96–108)
CO2 SERPL-SCNC: 30 MMOL/L — SIGNIFICANT CHANGE UP (ref 22–31)
CREAT SERPL-MCNC: 0.68 MG/DL — SIGNIFICANT CHANGE UP (ref 0.5–1.3)
EGFR: 104 ML/MIN/1.73M2 — SIGNIFICANT CHANGE UP
EOSINOPHIL # BLD AUTO: 0.17 K/UL — SIGNIFICANT CHANGE UP (ref 0–0.5)
EOSINOPHIL NFR BLD AUTO: 2.2 % — SIGNIFICANT CHANGE UP (ref 0–6)
GLUCOSE SERPL-MCNC: 106 MG/DL — HIGH (ref 70–99)
HCT VFR BLD CALC: 41.5 % — SIGNIFICANT CHANGE UP (ref 34.5–45)
HGB BLD-MCNC: 13.4 G/DL — SIGNIFICANT CHANGE UP (ref 11.5–15.5)
IMM GRANULOCYTES NFR BLD AUTO: 0.4 % — SIGNIFICANT CHANGE UP (ref 0–0.9)
LYMPHOCYTES # BLD AUTO: 2.41 K/UL — SIGNIFICANT CHANGE UP (ref 1–3.3)
LYMPHOCYTES # BLD AUTO: 30.8 % — SIGNIFICANT CHANGE UP (ref 13–44)
MCHC RBC-ENTMCNC: 31.2 PG — SIGNIFICANT CHANGE UP (ref 27–34)
MCHC RBC-ENTMCNC: 32.3 GM/DL — SIGNIFICANT CHANGE UP (ref 32–36)
MCV RBC AUTO: 96.5 FL — SIGNIFICANT CHANGE UP (ref 80–100)
MONOCYTES # BLD AUTO: 0.64 K/UL — SIGNIFICANT CHANGE UP (ref 0–0.9)
MONOCYTES NFR BLD AUTO: 8.2 % — SIGNIFICANT CHANGE UP (ref 2–14)
NEUTROPHILS # BLD AUTO: 4.55 K/UL — SIGNIFICANT CHANGE UP (ref 1.8–7.4)
NEUTROPHILS NFR BLD AUTO: 58.1 % — SIGNIFICANT CHANGE UP (ref 43–77)
PLATELET # BLD AUTO: 259 K/UL — SIGNIFICANT CHANGE UP (ref 150–400)
POTASSIUM SERPL-MCNC: 3.6 MMOL/L — SIGNIFICANT CHANGE UP (ref 3.5–5.3)
POTASSIUM SERPL-SCNC: 3.6 MMOL/L — SIGNIFICANT CHANGE UP (ref 3.5–5.3)
PROT SERPL-MCNC: 7.5 GM/DL — SIGNIFICANT CHANGE UP (ref 6–8.3)
RBC # BLD: 4.3 M/UL — SIGNIFICANT CHANGE UP (ref 3.8–5.2)
RBC # FLD: 13.3 % — SIGNIFICANT CHANGE UP (ref 10.3–14.5)
SODIUM SERPL-SCNC: 140 MMOL/L — SIGNIFICANT CHANGE UP (ref 135–145)
WBC # BLD: 7.82 K/UL — SIGNIFICANT CHANGE UP (ref 3.8–10.5)
WBC # FLD AUTO: 7.82 K/UL — SIGNIFICANT CHANGE UP (ref 3.8–10.5)

## 2023-02-24 PROCEDURE — 36415 COLL VENOUS BLD VENIPUNCTURE: CPT

## 2023-02-24 PROCEDURE — 82962 GLUCOSE BLOOD TEST: CPT

## 2023-02-24 PROCEDURE — 99284 EMERGENCY DEPT VISIT MOD MDM: CPT | Mod: 25

## 2023-02-24 PROCEDURE — 80053 COMPREHEN METABOLIC PANEL: CPT

## 2023-02-24 PROCEDURE — 96374 THER/PROPH/DIAG INJ IV PUSH: CPT

## 2023-02-24 PROCEDURE — 99284 EMERGENCY DEPT VISIT MOD MDM: CPT

## 2023-02-24 PROCEDURE — 85025 COMPLETE CBC W/AUTO DIFF WBC: CPT

## 2023-02-24 PROCEDURE — 96375 TX/PRO/DX INJ NEW DRUG ADDON: CPT

## 2023-02-24 RX ORDER — ACETAMINOPHEN 500 MG
650 TABLET ORAL ONCE
Refills: 0 | Status: COMPLETED | OUTPATIENT
Start: 2023-02-24 | End: 2023-02-24

## 2023-02-24 RX ORDER — SODIUM CHLORIDE 9 MG/ML
1000 INJECTION INTRAMUSCULAR; INTRAVENOUS; SUBCUTANEOUS ONCE
Refills: 0 | Status: COMPLETED | OUTPATIENT
Start: 2023-02-24 | End: 2023-02-24

## 2023-02-24 RX ORDER — METOCLOPRAMIDE HCL 10 MG
10 TABLET ORAL ONCE
Refills: 0 | Status: COMPLETED | OUTPATIENT
Start: 2023-02-24 | End: 2023-02-24

## 2023-02-24 RX ORDER — KETOROLAC TROMETHAMINE 30 MG/ML
30 SYRINGE (ML) INJECTION ONCE
Refills: 0 | Status: DISCONTINUED | OUTPATIENT
Start: 2023-02-24 | End: 2023-02-24

## 2023-02-24 RX ORDER — DIPHENHYDRAMINE HCL 50 MG
50 CAPSULE ORAL ONCE
Refills: 0 | Status: COMPLETED | OUTPATIENT
Start: 2023-02-24 | End: 2023-02-24

## 2023-02-24 RX ADMIN — SODIUM CHLORIDE 1000 MILLILITER(S): 9 INJECTION INTRAMUSCULAR; INTRAVENOUS; SUBCUTANEOUS at 15:29

## 2023-02-24 RX ADMIN — Medication 650 MILLIGRAM(S): at 15:29

## 2023-02-24 RX ADMIN — Medication 50 MILLIGRAM(S): at 15:31

## 2023-02-24 RX ADMIN — Medication 10 MILLIGRAM(S): at 15:30

## 2023-02-24 RX ADMIN — Medication 30 MILLIGRAM(S): at 15:30

## 2023-02-24 NOTE — ED STATDOCS - PATIENT PORTAL LINK FT
You can access the FollowMyHealth Patient Portal offered by [facility] by registering at the following website: [email or web address]. By joining [employer]’s FollowMyHealth portal, you will also be able to view your health information using other applications (apps) compatible with our system.

## 2023-02-24 NOTE — ED STATDOCS - ATTENDING APP SHARED VISIT CONTRIBUTION OF CARE
I, Negrito Weaver, DO personally saw the patient with VIC.  I have personally performed a face to face diagnostic evaluation on this patient.  I have reviewed the VIC note and agree with the history, exam, and plan of care, except as noted.  I personally saw the patient and performed a substantive portion of the visit including all aspects of the medical decision making.

## 2023-02-24 NOTE — ED ADULT NURSE NOTE - NS ED NURSE RECORD ANOTHER VITAL SIGN
Please see MyChart message  
Yes
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
## 2023-02-24 NOTE — ED STATDOCS - OBJECTIVE STATEMENT
53 y/o female with a PMHx of DM, h/o cholecystectomy presents to the ED c/o HA and n/v since last night. Pt took Tylenol at home, last dose 10am. No recent head trauma. No other complaints at this time.

## 2023-02-24 NOTE — ED STATDOCS - NS ED ATTENDING STATEMENT MOD
This was a shared visit with the VIC. I reviewed and verified the documentation and independently performed the documented:

## 2023-02-24 NOTE — ED STATDOCS - NSFOLLOWUPINSTRUCTIONS_ED_ALL_ED_FT
SIGUE A TU MÉDICO EN 1-2 DÍAS. REGRESE A LA ER PARA CUALQUIER SÍNTOMA PENDIENTE O NUEVAS PREOCUPACIONES.       Dolor de joo general sin causa  General Headache Without Cause  El dolor de joo es un dolor o malestar que se siente en la rylee de la joo o del otto. Hay muchas causas y tipos de kamila de joo. En algunos casos, es posible que no se encuentre la causa.  Siga estas indicaciones en guzman casa:  Controle guzman afección para detectar cualquier cambio. Infórmele a guzman médico acerca de los cambios. Siga estos pasos para controlar guzman afección:  Control del dolor      [place] los medicamentos de venta annalisa y los recetados solamente sherri se lo haya indicado el médico.Cuando sienta dolor de joo acuéstese en un cuarto oscuro y tranquilo.Si se lo indican, aplíquese hielo en la joo y en la rylee del otto:  Ponga el hielo en gary bolsa plástica.Coloque gary toalla entre la piel y la bolsa.Coloque el hielo ruthie 20 minutos, 2 a 3 veces al día.Si se lo indican, aplique calor en la rylee afectada. Use la caroline de calor que el médico le recomiende, sherri gary compresa de calor húmedo o gary almohadilla térmica.   Coloque gary toalla entre la piel y la caroline de calor. Aplique calor ruthie 20 a 30 minutos. Retire la caroline de calor si la piel se pone de color rodriguez brillante. Catalina es muy importante si no puede sentir dolor, calor o frío. Puede correr un riesgo mayor de sufrir quemaduras.Mantenga las luces tenues si las luces brillantes le molestan o regina kamila de joo empeoran.Comida y bebida     Mantenga un horario para las comidas.Si anh alcohol:   Limite la cantidad que anh a lo siguiente:   De 0 a 1 medida por día para las mujeres. De 0 a 2 medidas por día para los hombres. Esté atento a la cantidad de alcohol que hay en las bebidas que xavi. En los [place], gary medida equivale a gary botella de cerveza de 12 oz (355 ml), un vaso de vino de 5 oz (148 ml) o un vaso de gary bebida alcohólica de lev graduación de 1½ oz (44 ml).Deje de lesli cafeína o reduzca la cantidad que consume.Indicaciones generales        Lleve un registro diario para averiguar si ciertas cosas provocan los kamila de joo. Registre, por ejemplo, lo siguiente:  Lo que usted come y anh.El tiempo que duerme.Algún cambio en guzman dieta o en los medicamentos.Hágase masajes o pruebe otras formas de relajarse.Limite el estrés.Siéntese con la espalda recta. No contraiga (tensione) los músculos.No consuma ningún producto que contenga nicotina o tabaco. Estos incluyen los cigarrillos, el tabaco para mascar y los cigarrillos electrónicos. Si necesita ayuda para dejar de fumar, consulte al médico.Mya ejercicios con regularidad livan sherri se lo indicó el médico.Duerma lo suficiente. Catalina a menudo significa entre 7 y 9 horas de sueño cada noche.Concurra a todas las visitas de control sherri se lo haya indicado el médico. Catalina es importante.Comuníquese con un médico si:  Los medicamentos no logran aliviar los síntomas.Tiene un dolor de joo que es diferente a los otros kamila de joo.Tiene malestar estomacal (náuseas) o vomita.Tiene fiebre.Solicite ayuda inmediatamente si:  El dolor de joo empeora rápidamente.El dolor empeora después de hacer mucha actividad física.Sigue vomitando.Presenta rigidez en el otto.Tiene dificultad para stephania.Tiene dificultad para hablar.Siente dolor en el mateus o en el oído.Regina músculos están débiles, o pierde el control muscular.Pierde el equilibrio o tiene problemas para caminar.Siente que va a desvanecerse (perder el conocimiento) o se desmaya.Está desorientado (confundido).Tiene gary convulsión.Resumen  El dolor de joo es un dolor o malestar que se siente en la rylee de la joo o del otto.Hay muchas causas y tipos de kamila de joo. En algunos casos, es posible que no se encuentre la causa.Lleve un diario sherri ayuda para determinar la causa de los kamila de joo. Controle guzman afección para detectar cualquier cambio. Infórmele a guzman médico acerca de los cambios.Comuníquese con un médico si tiene un dolor de joo que es diferente de lo habitual o si el dolor de joo no se debi con los medicamentos.Solicite ayuda de inmediato si el dolor de joo es muy intenso, vomita, tiene dificultad para stephania, pierde el equilibrio o tiene gary convulsión.Esta información no tiene sherri fin reemplazar el consejo del médico. Asegúrese de hacerle al médico cualquier pregunta que tenga.

## 2023-02-24 NOTE — ED ADULT TRIAGE NOTE - CHIEF COMPLAINT QUOTE
Pt arrives to ED complaining of headache with nausea and vomiting since last night. Hx DM. BGM WNL in triage.

## 2023-02-24 NOTE — ED STATDOCS - CLINICAL SUMMARY MEDICAL DECISION MAKING FREE TEXT BOX
Adult female presents with HA. Vitals normal. Pending neuro exam by myself. Plan: migraine cocktail and if neuro exam normal, will not order CT head. Very low suspicion for SAH as HA gradual onset and not on AC. Pt has neuro follow up with HA. Adult female presents with HA. Vitals normal. Pending neuro exam by myself. Plan: migraine cocktail and if neuro exam normal, will not order CT head. Very low suspicion for SAH as HA gradual onset and not on AC. Pt has neuro follow up with [facility].    signed Yin Kay PA-C Pt seen and evaluated initially in intake by Dr Weaver. Pt declines  services.   52F with hx of migraines c/o HA and vomiting since last night. pt has had HAs like this in the past and has had to go to the ER before. No significant findings on labwork. Pt feeling much better after meds and fluids. has outpt appt with Neuro next month. return precautions given. Pt feeling well at DC, agrees with DC and plan of care. Getting ride home from family.

## 2023-03-16 NOTE — ED ADULT NURSE NOTE - NEURO MENTATION
normal
Implemented All Fall Risk Interventions:  [place] to call system. Call bell, personal items and telephone within reach. Instruct patient to call for assistance. Room bathroom lighting operational. Non-slip footwear when patient is off stretcher. Physically safe environment: no spills, clutter or unnecessary equipment. Stretcher in lowest position, wheels locked, appropriate side rails in place. Provide visual cue, wrist band, yellow gown, etc. Monitor gait and stability. Monitor for mental status changes and reorient to person, place, and time. Review medications for side effects contributing to fall risk. Reinforce activity limits and safety measures with patient and family.

## 2023-08-03 ENCOUNTER — EMERGENCY (EMERGENCY)
Facility: HOSPITAL | Age: 53
LOS: 0 days | Discharge: HOME | End: 2023-08-03
Attending: EMERGENCY MEDICINE
Payer: MEDICAID

## 2023-08-03 VITALS
DIASTOLIC BLOOD PRESSURE: 93 MMHG | SYSTOLIC BLOOD PRESSURE: 145 MMHG | RESPIRATION RATE: 18 BRPM | WEIGHT: 199.96 LBS | HEART RATE: 82 BPM | TEMPERATURE: 98 F | HEIGHT: 65 IN | OXYGEN SATURATION: 100 %

## 2023-08-03 DIAGNOSIS — R51.9 HEADACHE, UNSPECIFIED: ICD-10-CM

## 2023-08-03 DIAGNOSIS — R53.1 WEAKNESS: ICD-10-CM

## 2023-08-03 DIAGNOSIS — Z79.84 LONG TERM (CURRENT) USE OF ORAL HYPOGLYCEMIC DRUGS: ICD-10-CM

## 2023-08-03 DIAGNOSIS — R29.898 OTHER SYMPTOMS AND SIGNS INVOLVING THE MUSCULOSKELETAL SYSTEM: ICD-10-CM

## 2023-08-03 DIAGNOSIS — Z90.49 ACQUIRED ABSENCE OF OTHER SPECIFIED PARTS OF DIGESTIVE TRACT: Chronic | ICD-10-CM

## 2023-08-03 DIAGNOSIS — Z87.891 PERSONAL HISTORY OF NICOTINE DEPENDENCE: ICD-10-CM

## 2023-08-03 DIAGNOSIS — R26.81 UNSTEADINESS ON FEET: ICD-10-CM

## 2023-08-03 DIAGNOSIS — E11.9 TYPE 2 DIABETES MELLITUS WITHOUT COMPLICATIONS: ICD-10-CM

## 2023-08-03 DIAGNOSIS — Z90.49 ACQUIRED ABSENCE OF OTHER SPECIFIED PARTS OF DIGESTIVE TRACT: ICD-10-CM

## 2023-08-03 DIAGNOSIS — N39.0 URINARY TRACT INFECTION, SITE NOT SPECIFIED: ICD-10-CM

## 2023-08-03 DIAGNOSIS — R00.2 PALPITATIONS: ICD-10-CM

## 2023-08-03 LAB
ACETONE SERPL-MCNC: NEGATIVE — SIGNIFICANT CHANGE UP
ALBUMIN SERPL ELPH-MCNC: 4 G/DL — SIGNIFICANT CHANGE UP (ref 3.3–5)
ALP SERPL-CCNC: 82 U/L — SIGNIFICANT CHANGE UP (ref 40–120)
ALT FLD-CCNC: 44 U/L — SIGNIFICANT CHANGE UP (ref 12–78)
ANION GAP SERPL CALC-SCNC: 4 MMOL/L — LOW (ref 5–17)
APPEARANCE UR: CLEAR — SIGNIFICANT CHANGE UP
AST SERPL-CCNC: 27 U/L — SIGNIFICANT CHANGE UP (ref 15–37)
BACTERIA # UR AUTO: ABNORMAL
BASOPHILS # BLD AUTO: 0.02 K/UL — SIGNIFICANT CHANGE UP (ref 0–0.2)
BASOPHILS NFR BLD AUTO: 0.2 % — SIGNIFICANT CHANGE UP (ref 0–2)
BILIRUB SERPL-MCNC: 0.3 MG/DL — SIGNIFICANT CHANGE UP (ref 0.2–1.2)
BILIRUB UR-MCNC: NEGATIVE — SIGNIFICANT CHANGE UP
BUN SERPL-MCNC: 21 MG/DL — SIGNIFICANT CHANGE UP (ref 7–23)
CALCIUM SERPL-MCNC: 9.3 MG/DL — SIGNIFICANT CHANGE UP (ref 8.5–10.1)
CHLORIDE SERPL-SCNC: 105 MMOL/L — SIGNIFICANT CHANGE UP (ref 96–108)
CO2 SERPL-SCNC: 29 MMOL/L — SIGNIFICANT CHANGE UP (ref 22–31)
COLOR SPEC: YELLOW — SIGNIFICANT CHANGE UP
CREAT SERPL-MCNC: 0.97 MG/DL — SIGNIFICANT CHANGE UP (ref 0.5–1.3)
DIFF PNL FLD: ABNORMAL
EGFR: 70 ML/MIN/1.73M2 — SIGNIFICANT CHANGE UP
EOSINOPHIL # BLD AUTO: 0.14 K/UL — SIGNIFICANT CHANGE UP (ref 0–0.5)
EOSINOPHIL NFR BLD AUTO: 1.5 % — SIGNIFICANT CHANGE UP (ref 0–6)
EPI CELLS # UR: SIGNIFICANT CHANGE UP
GLUCOSE SERPL-MCNC: 114 MG/DL — HIGH (ref 70–99)
GLUCOSE UR QL: NEGATIVE — SIGNIFICANT CHANGE UP
HCT VFR BLD CALC: 41.4 % — SIGNIFICANT CHANGE UP (ref 34.5–45)
HGB BLD-MCNC: 13.6 G/DL — SIGNIFICANT CHANGE UP (ref 11.5–15.5)
IMM GRANULOCYTES NFR BLD AUTO: 0.4 % — SIGNIFICANT CHANGE UP (ref 0–0.9)
KETONES UR-MCNC: NEGATIVE — SIGNIFICANT CHANGE UP
LEUKOCYTE ESTERASE UR-ACNC: ABNORMAL
LYMPHOCYTES # BLD AUTO: 2.5 K/UL — SIGNIFICANT CHANGE UP (ref 1–3.3)
LYMPHOCYTES # BLD AUTO: 26.5 % — SIGNIFICANT CHANGE UP (ref 13–44)
MCHC RBC-ENTMCNC: 31.6 PG — SIGNIFICANT CHANGE UP (ref 27–34)
MCHC RBC-ENTMCNC: 32.9 GM/DL — SIGNIFICANT CHANGE UP (ref 32–36)
MCV RBC AUTO: 96.1 FL — SIGNIFICANT CHANGE UP (ref 80–100)
MONOCYTES # BLD AUTO: 0.8 K/UL — SIGNIFICANT CHANGE UP (ref 0–0.9)
MONOCYTES NFR BLD AUTO: 8.5 % — SIGNIFICANT CHANGE UP (ref 2–14)
NEUTROPHILS # BLD AUTO: 5.92 K/UL — SIGNIFICANT CHANGE UP (ref 1.8–7.4)
NEUTROPHILS NFR BLD AUTO: 62.9 % — SIGNIFICANT CHANGE UP (ref 43–77)
NITRITE UR-MCNC: NEGATIVE — SIGNIFICANT CHANGE UP
PH UR: 6 — SIGNIFICANT CHANGE UP (ref 5–8)
PLATELET # BLD AUTO: 253 K/UL — SIGNIFICANT CHANGE UP (ref 150–400)
POTASSIUM SERPL-MCNC: 4.2 MMOL/L — SIGNIFICANT CHANGE UP (ref 3.5–5.3)
POTASSIUM SERPL-SCNC: 4.2 MMOL/L — SIGNIFICANT CHANGE UP (ref 3.5–5.3)
PROT SERPL-MCNC: 7.6 GM/DL — SIGNIFICANT CHANGE UP (ref 6–8.3)
PROT UR-MCNC: NEGATIVE — SIGNIFICANT CHANGE UP
RBC # BLD: 4.31 M/UL — SIGNIFICANT CHANGE UP (ref 3.8–5.2)
RBC # FLD: 13.2 % — SIGNIFICANT CHANGE UP (ref 10.3–14.5)
RBC CASTS # UR COMP ASSIST: SIGNIFICANT CHANGE UP /HPF (ref 0–4)
SODIUM SERPL-SCNC: 138 MMOL/L — SIGNIFICANT CHANGE UP (ref 135–145)
SP GR SPEC: 1 — LOW (ref 1.01–1.02)
UROBILINOGEN FLD QL: NEGATIVE — SIGNIFICANT CHANGE UP
WBC # BLD: 9.42 K/UL — SIGNIFICANT CHANGE UP (ref 3.8–10.5)
WBC # FLD AUTO: 9.42 K/UL — SIGNIFICANT CHANGE UP (ref 3.8–10.5)
WBC UR QL: ABNORMAL /HPF (ref 0–5)

## 2023-08-03 PROCEDURE — 81001 URINALYSIS AUTO W/SCOPE: CPT

## 2023-08-03 PROCEDURE — 96375 TX/PRO/DX INJ NEW DRUG ADDON: CPT

## 2023-08-03 PROCEDURE — 36415 COLL VENOUS BLD VENIPUNCTURE: CPT

## 2023-08-03 PROCEDURE — 80053 COMPREHEN METABOLIC PANEL: CPT

## 2023-08-03 PROCEDURE — 85025 COMPLETE CBC W/AUTO DIFF WBC: CPT

## 2023-08-03 PROCEDURE — 99284 EMERGENCY DEPT VISIT MOD MDM: CPT

## 2023-08-03 PROCEDURE — 82009 KETONE BODYS QUAL: CPT

## 2023-08-03 PROCEDURE — 96374 THER/PROPH/DIAG INJ IV PUSH: CPT

## 2023-08-03 PROCEDURE — 99284 EMERGENCY DEPT VISIT MOD MDM: CPT | Mod: 25

## 2023-08-03 RX ORDER — CEFPODOXIME PROXETIL 100 MG
1 TABLET ORAL
Qty: 10 | Refills: 0
Start: 2023-08-03 | End: 2023-08-13

## 2023-08-03 RX ORDER — CEFPODOXIME PROXETIL 100 MG
1 TABLET ORAL
Qty: 10 | Refills: 0
Start: 2023-08-03 | End: 2023-08-07

## 2023-08-03 RX ORDER — SODIUM CHLORIDE 9 MG/ML
1000 INJECTION INTRAMUSCULAR; INTRAVENOUS; SUBCUTANEOUS ONCE
Refills: 0 | Status: COMPLETED | OUTPATIENT
Start: 2023-08-03 | End: 2023-08-03

## 2023-08-03 RX ORDER — METOCLOPRAMIDE HCL 10 MG
10 TABLET ORAL ONCE
Refills: 0 | Status: COMPLETED | OUTPATIENT
Start: 2023-08-03 | End: 2023-08-03

## 2023-08-03 RX ORDER — KETOROLAC TROMETHAMINE 30 MG/ML
15 SYRINGE (ML) INJECTION ONCE
Refills: 0 | Status: DISCONTINUED | OUTPATIENT
Start: 2023-08-03 | End: 2023-08-03

## 2023-08-03 RX ADMIN — Medication 10 MILLIGRAM(S): at 20:18

## 2023-08-03 RX ADMIN — SODIUM CHLORIDE 1000 MILLILITER(S): 9 INJECTION INTRAMUSCULAR; INTRAVENOUS; SUBCUTANEOUS at 20:19

## 2023-08-03 RX ADMIN — Medication 15 MILLIGRAM(S): at 20:18

## 2023-08-03 NOTE — ED STATDOCS - PATIENT PORTAL LINK FT
You can access the FollowMyHealth Patient Portal offered by [facility] by registering at the following website: [email or web address]. By joining [employer]’s FollowMyHealth portal, you will also be able to view your health information using other applications (apps) compatible with our system.

## 2023-08-03 NOTE — ED STATDOCS - OBJECTIVE STATEMENT
ID#: [id number]  Pt is a 52y female with a PMH of DM (pills Metformin 500mg), s/p cholecystectomy, peptic ulcer disease, former smoker presents to the ED c/o generalized HA onset 8 days with associated nausea and vomiting. Feels generalized weakness, and unstable gait secondary to knee weakness. Has not been able to keep food down, has to eat in small amounts. Also endorses palpitations. Took Excedrin, Aspirin, and Tylenol with little relief. Has had these kinds of HAs in the past with similar symptoms (endorses history of migraines, was seen at  w/ imaging in past for diagnosis.) Also endorses possibly unrelated back pain. Denies fever. NKA.

## 2023-08-03 NOTE — ED STATDOCS - PHYSICAL EXAMINATION
Constitutional: NAD AOx3  Eyes: PERRL EOMI  Head: Normocephalic atraumatic  Mouth: MMM  Cardiac: regular rate and rhythm  Resp: Lungs CTAB  GI: Abd s/nd/nt  Neuro: CN2-12 grossly intact, SWAIN x 4  Skin: No visible rashes

## 2023-08-03 NOTE — ED STATDOCS - CLINICAL SUMMARY MEDICAL DECISION MAKING FREE TEXT BOX
R/o DKA. Will do IV with fluids, Reglan, Tylenol. Urine and possible sono if needed. R/o DKA. Will do IV with fluids, Reglan, Tylenol. Urine and possible sono if needed.    pt had ct and mr imaging of brain June 2022, no acute findings. will not re-image. no neurologic deficits on exam.

## 2023-08-09 NOTE — ED POST DISCHARGE NOTE - RESULT SUMMARY
Pt left message to send abx to [facility] instead of [facility]. rx sent. signed Yin Kay PA-C

## 2023-12-05 NOTE — ED PROVIDER NOTE - CROS ED ROS STATEMENT
DIET: You may resume your home diet. If nausea is present, increase your diet gradually with fluids and bland  Foods    ACTIVITY LEVEL: You have received sedation or an anesthetic, you may feel sleepy for several hours. Rest until  you are more awake. Gradually resume your normal activities    Medications: Pain medication should be taken only if needed and as directed. If antibiotics are prescribed, the  medication should be taken until completed.    No driving, alcoholic beverages or signing legal documents for next 24 hours or while taking pain  medication.    CALL THE DOCTOR:  For any obvious bleeding (some dried blood over the incision is normal).  Redness, swelling, foul smell around incision or fever over 101.  Shortness of breath, Coughing up Bloody sputum, Pains or Swelling in your Calves .  Persistent pain or nausea not relieved by medication.    If any unusual problems or difficulties occur contact your doctor. If you cannot contact your doctor but  feel your signs and symptoms warrant a physicians attention return to the emergency room.   all other ROS negative except as per HPI

## 2023-12-08 ENCOUNTER — APPOINTMENT (OUTPATIENT)
Dept: NEUROLOGY | Facility: CLINIC | Age: 53
End: 2023-12-08

## 2023-12-09 NOTE — ED PROVIDER NOTE - DISPOSITION TYPE
comfortable appearance/family/SO at bedside/resting/sleeping comfortable appearance/family/SO at bedside/resting/sleeping DISCHARGE

## 2024-01-05 NOTE — ED ADULT NURSE NOTE - IS THE PATIENT ABLE TO BE SCREENED?
Problem: Chronic Conditions and Co-morbidities  Goal: Patient's chronic conditions and co-morbidity symptoms are monitored and maintained or improved  Outcome: Adequate for Discharge     Problem: Chronic Conditions and Co-morbidities  Goal: Patient's chronic conditions and co-morbidity symptoms are monitored and maintained or improved  Outcome: Adequate for Discharge     Problem: Safety - Adult  Goal: Absence of infection signs and symptoms  Outcome: Adequate for Discharge      Yes

## 2024-01-11 NOTE — ED PROVIDER NOTE - QRS
76 You can access the FollowMyHealth Patient Portal offered by [facility] by registering at the following website: [email or web address]. By joining [employer]’s FollowMyHealth portal, you will also be able to view your health information using other applications (apps) compatible with our system. You can access the FollowMyHealth Patient Portal offered by [facility] by registering at the following website: [email or web address]. By joining [employer]’s FollowMyHealth portal, you will also be able to view your health information using other applications (apps) compatible with our system.

## 2024-01-22 NOTE — PHARMACOTHERAPY INTERVENTION NOTE - INTERVENTION CATEGORIES
Open order in Dr. Gaston's name removed as patient has established with Hyacinth WHITE.   
Patient has established care with Hyacinth Van from Dr Gaston     Please change all open orders to Hyacinth's name     Thank you   
Med Reconciliation

## 2024-01-25 NOTE — ED ADULT NURSE NOTE - NSFALLRSKOUTCOME_ED_ALL_ED
Detail Level: Zone Continue Regimen: Moisturizing daily Render In Strict Bullet Format?: No Initiate Treatment: Vaseline daily. Patient injured herself, tripped over a cardboard box.\\nPatient was reassured area was healing well. Universal Safety Interventions

## 2024-06-19 NOTE — ED ADULT NURSE NOTE - PRO INTERPRETER NEED 2
Clinical Nutrition / Initial Assessment     Reason for Assessment:  Screened at nutritional risk due to:  Recent weight loss without trying    Assessment:   Client History:  pt admitted with dehydration. Reported wt loss and decreased appetite. Was hospitalized in May for acute renal failure. Poor appetite, will add supplements TID to trays. Significant wt loss in past month: ~18#.   Diet Order:  low fat/low Na  Oral Intake:  monitor   Supplement Intake:  will add Ensure TID to trays  Weight:   Wt Readings from Last 10 Encounters:   06/18/24 44.1 kg (97 lb 4.8 oz)   05/28/24 54.1 kg (119 lb 4.8 oz)   05/14/24 52.5 kg (115 lb 12.8 oz)   04/19/24 50.8 kg (112 lb)   04/11/24 52.6 kg (116 lb)   04/09/24 52.6 kg (116 lb)   03/29/24 52.4 kg (115 lb 9.6 oz)   02/27/24 54.3 kg (119 lb 9.6 oz)   02/13/24 54.4 kg (120 lb)   02/05/24 55.7 kg (122 lb 12.8 oz)      Estimated nutritional needs based on:  current body weight  44 kg / 97 lbs  Estimated energy needs:  7569-3311 kcal/day (30-35 kcal/kg)  Estimated protein needs:  53-66 gm/day (1.2-1.5 g/kg)    Malnutrition Criteria:  (Need to have 2 indicators to qualify recommendation)  Energy Intake:  Chronic Moderate: < 75% of estimated energy requirement for >/= 1 month  Interpretation of Weight Loss:  Acute Severe:   > 5% in 1 month  Physical Findings:  Chronic Body Fat Loss:  severe and Chronic Muscle Mass Loss:  severe  Reduced  Strength:  Not Measured    Recommended Nutrition Diagnosis:   Severe Malnutrition in the context of acute on chronic illness or injury - based on AND/ASPEN Clinical Characterstics of Malnutrition May 2012      Nutrition Education: Nutrition education will be provided as appropriate.    Nutrition Diagnosis: Weight:   weight loss related to inadequate energy intakes and mismatch of intakes vs expenditure as evidenced by wt down ~18# in past month, decreased appetite.    Intervention:  Nutrition Prescription:     Nutrition Intervention(s):Recommended  general, healthful diet  1. Meals and Snacks: low fat/low Na  2. Medical Food Supplement: Ensure TID on trays     Nutrition Goal(s):  1. Pt will consume 50% or more of meals and supplements   2. Pt will tolerate diet as ordered   3. Pt will not have unplanned wt loss during hospitalization     Monitoring and Evaluation:   Food Intake, diet tolerance, weights     Discharge Recommendation:   Nutrition Discharge Planning  Recommend supplements on discharge, at least BID    RD will reassess in within 1-5 days or sooner.  Mirlande Knott RD on 6/19/2024 at 8:49 AM     English

## 2024-10-14 NOTE — ED ADULT TRIAGE NOTE - HEIGHT IN INCHES
[Normal Development] : Normal Development [None] : none [Smiles responsively] : smiles responsively [Vocalizes with simple cooing] : vocalizes with simple cooing [Lifts head and chest in prone] : lifts head and chest in prone [Opens and shuts hands] : opens and shuts hands [Passed] : passed 2